# Patient Record
Sex: FEMALE | Race: WHITE | Employment: UNEMPLOYED | URBAN - NONMETROPOLITAN AREA
[De-identification: names, ages, dates, MRNs, and addresses within clinical notes are randomized per-mention and may not be internally consistent; named-entity substitution may affect disease eponyms.]

---

## 2022-08-29 ENCOUNTER — OFFICE VISIT (OUTPATIENT)
Dept: PRIMARY CARE CLINIC | Age: 57
End: 2022-08-29
Payer: MEDICAID

## 2022-08-29 VITALS
WEIGHT: 168 LBS | BODY MASS INDEX: 30.91 KG/M2 | TEMPERATURE: 97 F | HEART RATE: 104 BPM | HEIGHT: 62 IN | OXYGEN SATURATION: 97 % | DIASTOLIC BLOOD PRESSURE: 70 MMHG | SYSTOLIC BLOOD PRESSURE: 120 MMHG

## 2022-08-29 DIAGNOSIS — G89.29 CHRONIC MIDLINE LOW BACK PAIN WITHOUT SCIATICA: ICD-10-CM

## 2022-08-29 DIAGNOSIS — Z13.1 SCREENING FOR DIABETES MELLITUS (DM): ICD-10-CM

## 2022-08-29 DIAGNOSIS — R31.0 GROSS HEMATURIA: ICD-10-CM

## 2022-08-29 DIAGNOSIS — Z00.00 ENCOUNTER FOR MEDICAL EXAMINATION TO ESTABLISH CARE: ICD-10-CM

## 2022-08-29 DIAGNOSIS — Z13.220 SCREENING FOR LIPID DISORDERS: ICD-10-CM

## 2022-08-29 DIAGNOSIS — F33.1 MODERATE EPISODE OF RECURRENT MAJOR DEPRESSIVE DISORDER (HCC): Primary | ICD-10-CM

## 2022-08-29 DIAGNOSIS — R53.83 FATIGUE, UNSPECIFIED TYPE: ICD-10-CM

## 2022-08-29 DIAGNOSIS — R03.0 ELEVATED BLOOD PRESSURE READING: ICD-10-CM

## 2022-08-29 DIAGNOSIS — M54.50 CHRONIC MIDLINE LOW BACK PAIN WITHOUT SCIATICA: ICD-10-CM

## 2022-08-29 DIAGNOSIS — Z13.29 SCREENING FOR THYROID DISORDER: ICD-10-CM

## 2022-08-29 LAB
APPEARANCE FLUID: ABNORMAL
BILIRUBIN, POC: ABNORMAL
BLOOD URINE, POC: ABNORMAL
CLARITY, POC: ABNORMAL
COLOR, POC: YELLOW
GLUCOSE URINE, POC: ABNORMAL
KETONES, POC: ABNORMAL
LEUKOCYTE EST, POC: ABNORMAL
NITRITE, POC: POSITIVE
PH, POC: ABNORMAL
PROTEIN, POC: ABNORMAL
SPECIFIC GRAVITY, POC: ABNORMAL
UROBILINOGEN, POC: ABNORMAL

## 2022-08-29 PROCEDURE — 81002 URINALYSIS NONAUTO W/O SCOPE: CPT | Performed by: NURSE PRACTITIONER

## 2022-08-29 PROCEDURE — 99204 OFFICE O/P NEW MOD 45 MIN: CPT | Performed by: NURSE PRACTITIONER

## 2022-08-29 RX ORDER — DESVENLAFAXINE 25 MG/1
25 TABLET, EXTENDED RELEASE ORAL DAILY
Qty: 30 TABLET | Refills: 0 | Status: SHIPPED | OUTPATIENT
Start: 2022-08-29

## 2022-08-29 RX ORDER — TIZANIDINE 4 MG/1
4 TABLET ORAL 3 TIMES DAILY PRN
Qty: 30 TABLET | Refills: 0 | Status: SHIPPED | OUTPATIENT
Start: 2022-08-29

## 2022-08-29 SDOH — ECONOMIC STABILITY: FOOD INSECURITY: WITHIN THE PAST 12 MONTHS, THE FOOD YOU BOUGHT JUST DIDN'T LAST AND YOU DIDN'T HAVE MONEY TO GET MORE.: NEVER TRUE

## 2022-08-29 SDOH — ECONOMIC STABILITY: FOOD INSECURITY: WITHIN THE PAST 12 MONTHS, YOU WORRIED THAT YOUR FOOD WOULD RUN OUT BEFORE YOU GOT MONEY TO BUY MORE.: NEVER TRUE

## 2022-08-29 ASSESSMENT — PATIENT HEALTH QUESTIONNAIRE - PHQ9
SUM OF ALL RESPONSES TO PHQ QUESTIONS 1-9: 10
2. FEELING DOWN, DEPRESSED OR HOPELESS: 3
SUM OF ALL RESPONSES TO PHQ QUESTIONS 1-9: 10
8. MOVING OR SPEAKING SO SLOWLY THAT OTHER PEOPLE COULD HAVE NOTICED. OR THE OPPOSITE, BEING SO FIGETY OR RESTLESS THAT YOU HAVE BEEN MOVING AROUND A LOT MORE THAN USUAL: 0
3. TROUBLE FALLING OR STAYING ASLEEP: 1
9. THOUGHTS THAT YOU WOULD BE BETTER OFF DEAD, OR OF HURTING YOURSELF: 0
SUM OF ALL RESPONSES TO PHQ QUESTIONS 1-9: 10
7. TROUBLE CONCENTRATING ON THINGS, SUCH AS READING THE NEWSPAPER OR WATCHING TELEVISION: 1
4. FEELING TIRED OR HAVING LITTLE ENERGY: 1
1. LITTLE INTEREST OR PLEASURE IN DOING THINGS: 0
10. IF YOU CHECKED OFF ANY PROBLEMS, HOW DIFFICULT HAVE THESE PROBLEMS MADE IT FOR YOU TO DO YOUR WORK, TAKE CARE OF THINGS AT HOME, OR GET ALONG WITH OTHER PEOPLE: 2
SUM OF ALL RESPONSES TO PHQ QUESTIONS 1-9: 10
5. POOR APPETITE OR OVEREATING: 1
6. FEELING BAD ABOUT YOURSELF - OR THAT YOU ARE A FAILURE OR HAVE LET YOURSELF OR YOUR FAMILY DOWN: 3
SUM OF ALL RESPONSES TO PHQ9 QUESTIONS 1 & 2: 3

## 2022-08-29 ASSESSMENT — ENCOUNTER SYMPTOMS
RESPIRATORY NEGATIVE: 1
DIARRHEA: 0
NAUSEA: 0
BACK PAIN: 1
EYES NEGATIVE: 1
VOMITING: 0
ALLERGIC/IMMUNOLOGIC NEGATIVE: 1
ABDOMINAL PAIN: 0
SHORTNESS OF BREATH: 0
CONSTIPATION: 0
COUGH: 0

## 2022-08-29 ASSESSMENT — SOCIAL DETERMINANTS OF HEALTH (SDOH): HOW HARD IS IT FOR YOU TO PAY FOR THE VERY BASICS LIKE FOOD, HOUSING, MEDICAL CARE, AND HEATING?: SOMEWHAT HARD

## 2022-08-29 NOTE — PROGRESS NOTES
[facility] PHYSICIAN SERVICES  [facility]  [address] [place] [address]  [address] [place] [address]  Dept: [phone]  Dept Fax: [phone]  Loc: [phone]    Eva Hendricks is a 61 y.o. female who presents today for her medical conditions/complaints as noted below. Eva Hendricks is c/o of New Patient (Former Dr. Katina Arreaga patient.  ), Hematuria (2 to 3 weeks. No Hx of kidney stones.  ), and Depression (Hx of and would like to discuss treatment options.  )        HPI:     HPI     This 60-year-old female presents today to [facility] care. She is a former patient of Dr. Katina Arreaga. States that she has been having visible blood in her urine for 2 to 3 weeks. She states that it gets better when she stops drinking [employer] [facility] and she drinks a little bit of water. She states she normally does not drink water but just drinks [employer] [facility]. She denies any history of kidney stones. She she states she does have some right flank pain that she thought might be of rib pain. She states she has not had a Pap smear in 20 years that she had her child. She states that she has not seen a provider since before COVID. She does states that she is having ongoing issues with depression. She states she has not taken the Cymbalta in probably 2 years. She states she probably stopped taking it when she ran out of it. She states she does not think that it really helped. She does states that she did take some Pristiq at 1 time and felt like it was helpful. She states she was also on Zoloft before and it helped at first but then it stopped working. Chief Complaint   Patient presents with    New Patient     Former Dr. Katina Arreaga patient. Hematuria     2 to 3 weeks. No Hx of kidney stones. Depression     Hx of and would like to discuss treatment options.        Past Medical History:   Diagnosis Date    Anxiety     Constipation     Depression       Past Surgical History:   Procedure Laterality Date     SECTION COLONOSCOPY N/A 7/13/2016    Dr Katarina Bowles AP (-) dysplasia, 5 yr recall    NECK SURGERY         Vitals 8/29/2022 7/13/2016 7/13/2016 7/13/2016 7/13/2016 5/47/6625   SYSTOLIC 346 548 87 73 - -   DIASTOLIC 70 83 55 54 - -   Pulse 104 88 92 - - -   Temp 97 - - - - -   Resp - 18 18 - - -   SpO2 97 100 100 99 99 99   Weight 168 lb - - - - -   Height 5' 2\" - - - - -   Body mass index 30.72 kg/m2 - - - - -   Some recent data might be hidden       Family History   Problem Relation Age of Onset    Cancer Father     Colon Polyps Neg Hx     Colon Cancer Neg Hx     Esophageal Cancer Neg Hx     Liver Cancer Neg Hx     Liver Disease Neg Hx     Rectal Cancer Neg Hx     Stomach Cancer Neg Hx        Social History     Tobacco Use    Smoking status: Every Day     Packs/day: 1.00     Types: Cigarettes    Smokeless tobacco: Never   Substance Use Topics    Alcohol use: Yes     Comment: a couple glasses of wine monthly      Current Outpatient Medications on File Prior to Visit   Medication Sig Dispense Refill    DULoxetine (CYMBALTA) 60 MG capsule Take 60 mg by mouth daily (Patient not taking: Reported on 8/29/2022)       No current facility-administered medications on file prior to visit.      Allergies   Allergen Reactions    Sulfa Antibiotics        Health Maintenance   Topic Date Due    COVID-19 Vaccine (1) Never done    Pneumococcal 0-64 years Vaccine (1 - PCV) Never done    Depression Monitoring  Never done    HIV screen  Never done    Hepatitis C screen  Never done    DTaP/Tdap/Td vaccine (1 - Tdap) Never done    Cervical cancer screen  Never done    Diabetes screen  Never done    Lipids  Never done    Breast cancer screen  Never done    Shingles vaccine (1 of 2) Never done    Colorectal Cancer Screen  07/13/2021    Flu vaccine (1) 09/01/2022    Hepatitis A vaccine  Aged Out    Hepatitis B vaccine  Aged Out    Hib vaccine  Aged Out    Meningococcal (ACWY) vaccine  Aged Out       Subjective:      Review of Systems Constitutional:  Positive for fatigue. Negative for fever. HENT: Negative. Eyes: Negative. Respiratory: Negative. Negative for cough and shortness of breath. Cardiovascular:  Positive for leg swelling. Negative for chest pain and palpitations. Gastrointestinal:  Negative for abdominal pain, constipation, diarrhea, nausea and vomiting. Endocrine: Negative. Genitourinary:  Positive for difficulty urinating, dysuria, flank pain and hematuria. Negative for menstrual problem and vaginal discharge. Musculoskeletal:  Positive for arthralgias, back pain, myalgias, neck pain and neck stiffness. Skin: Negative. Negative for rash. Allergic/Immunologic: Negative. Neurological:  Positive for headaches. Hematological: Negative. Psychiatric/Behavioral:  Positive for dysphoric mood and sleep disturbance. Negative for self-injury and suicidal ideas. The patient is not nervous/anxious. Objective:     Physical Exam  Vitals and nursing note reviewed. Constitutional:       General: She is not in acute distress. Appearance: Normal appearance. She is normal weight. She is not ill-appearing or toxic-appearing. HENT:      Head: Normocephalic and atraumatic. Nose: Nose normal.      Mouth/Throat:      Mouth: Mucous membranes are moist.   Eyes:      Extraocular Movements: Extraocular movements intact. Conjunctiva/sclera: Conjunctivae normal.      Pupils: Pupils are equal, round, and reactive to light. Neck:      Vascular: No carotid bruit. Cardiovascular:      Rate and Rhythm: Normal rate and regular rhythm. Pulses: Normal pulses. Heart sounds: Normal heart sounds. No murmur heard. Pulmonary:      Effort: Pulmonary effort is normal. No respiratory distress. Breath sounds: Normal breath sounds. No wheezing, rhonchi or rales. Abdominal:      General: Bowel sounds are normal. There is no distension. Palpations: Abdomen is soft. Tenderness:  There is no abdominal tenderness. Musculoskeletal:         General: No swelling or tenderness. Normal range of motion. Cervical back: Normal range of motion and neck supple. No rigidity. Right lower leg: Edema present. Left lower leg: Edema present. Lymphadenopathy:      Cervical: No cervical adenopathy. Skin:     General: Skin is warm and dry. Coloration: Skin is not jaundiced or pale. Findings: No erythema or rash. Neurological:      Mental Status: She is alert and oriented to person, place, and time. Mental status is at baseline. Psychiatric:         Mood and Affect: Mood normal.         Behavior: Behavior normal.         Thought Content: Thought content normal.         Judgment: Judgment normal.     /70   Pulse (!) 104   Temp 97 °F (36.1 °C)   Ht 5' 2\" (1.575 m)   Wt 168 lb (76.2 kg)   SpO2 97%   BMI 30.73 kg/m²     Assessment:       Diagnosis Orders   1. Moderate episode of recurrent major depressive disorder ([facility] [address]        2. Gross hematuria  POCT Urinalysis no Micro    Culture, Urine      3. Screening for lipid disorders  Lipid Panel      4. Screening for thyroid disorder  TSH    T4, Free      5. Screening for diabetes mellitus (DM)  Hemoglobin A1C      6. Elevated blood pressure reading  CBC with Auto Differential    Comprehensive Metabolic Panel    TSH    T4, Free    Lipid Panel    Hemoglobin A1C    Vitamin D 25 Hydroxy      7. Fatigue, unspecified type  CBC with Auto Differential    Comprehensive Metabolic Panel    TSH    T4, Free    Lipid Panel    Hemoglobin A1C    Vitamin D 25 Hydroxy      8. Chronic midline low back pain without sciatica        9. Encounter for medical examination to establish care              Plan:     Chronic condition uncontrolled  Patient denies any SI or HI in the office today. She states that she has previously been on both Zoloft and Cymbalta. She states that both helped at first but then stopped working.   She states she stopped taking the medications. All patient questions answered. Pt voiced understanding. Reviewed health maintenance. Instructed to continue currentmedications, diet and exercise. Patient agreed with treatment plan. Follow up as directed. MEDICATIONS:  Orders Placed This Encounter   Medications    desvenlafaxine succinate (PRISTIQ) 25 MG TB24 extended release tablet     Sig: Take 1 tablet by mouth daily     Dispense:  30 tablet     Refill:  0    tiZANidine (ZANAFLEX) 4 MG tablet     Sig: Take 1 tablet by mouth 3 times daily as needed (muscle spasm)     Dispense:  30 tablet     Refill:  0         ORDERS:  Orders Placed This Encounter   Procedures    Culture, Urine    CBC with Auto Differential    Comprehensive Metabolic Panel    TSH    T4, Free    Lipid Panel    Hemoglobin A1C    Vitamin D 25 Hydroxy    POCT Urinalysis no Micro       Follow-up:  Return in about 4 weeks (around 9/26/2022) for for fasting labs and annual physical.    PATIENT INSTRUCTIONS:  There are no Patient Instructions on file for this visit. Electronically signed by SANTO Jolly NP on 8/29/2022 at 3:25 PM    EMR Dragon/transcription disclaimer:  Much of thisencounter note is electronic transcription/translation of spoken language to printed texts. The electronic translation of spoken language may be erroneous, or at times, nonsensical words or phrases may be inadvertentlytranscribed.   Although I have reviewed the note for such errors, some may still exist.

## 2022-08-31 LAB
ORGANISM: ABNORMAL
URINE CULTURE, ROUTINE: ABNORMAL
URINE CULTURE, ROUTINE: ABNORMAL

## 2022-09-01 RX ORDER — NITROFURANTOIN 25; 75 MG/1; MG/1
100 CAPSULE ORAL 2 TIMES DAILY
Qty: 20 CAPSULE | Refills: 0 | Status: SHIPPED | OUTPATIENT
Start: 2022-09-01 | End: 2022-09-11

## 2022-12-06 ENCOUNTER — OFFICE VISIT (OUTPATIENT)
Dept: PRIMARY CARE CLINIC | Age: 57
End: 2022-12-06
Payer: MEDICAID

## 2022-12-06 ENCOUNTER — TELEPHONE (OUTPATIENT)
Dept: PSYCHIATRY | Age: 57
End: 2022-12-06

## 2022-12-06 VITALS
HEIGHT: 62 IN | DIASTOLIC BLOOD PRESSURE: 82 MMHG | BODY MASS INDEX: 30.36 KG/M2 | WEIGHT: 165 LBS | OXYGEN SATURATION: 98 % | TEMPERATURE: 97 F | SYSTOLIC BLOOD PRESSURE: 132 MMHG | HEART RATE: 100 BPM

## 2022-12-06 DIAGNOSIS — R45.851 SUICIDAL IDEATION: Primary | ICD-10-CM

## 2022-12-06 DIAGNOSIS — F41.9 ANXIETY: ICD-10-CM

## 2022-12-06 DIAGNOSIS — F32.A DEPRESSION, UNSPECIFIED DEPRESSION TYPE: ICD-10-CM

## 2022-12-06 DIAGNOSIS — F33.1 MODERATE EPISODE OF RECURRENT MAJOR DEPRESSIVE DISORDER (HCC): ICD-10-CM

## 2022-12-06 PROCEDURE — 99214 OFFICE O/P EST MOD 30 MIN: CPT | Performed by: NURSE PRACTITIONER

## 2022-12-06 RX ORDER — BUSPIRONE HYDROCHLORIDE 5 MG/1
5 TABLET ORAL 3 TIMES DAILY
Qty: 90 TABLET | Refills: 0 | Status: SHIPPED | OUTPATIENT
Start: 2022-12-06 | End: 2023-01-05

## 2022-12-06 RX ORDER — DESVENLAFAXINE 25 MG/1
25 TABLET, EXTENDED RELEASE ORAL DAILY
Qty: 30 TABLET | Refills: 0 | Status: SHIPPED | OUTPATIENT
Start: 2022-12-06

## 2022-12-06 ASSESSMENT — ENCOUNTER SYMPTOMS
SHORTNESS OF BREATH: 0
RESPIRATORY NEGATIVE: 1
GASTROINTESTINAL NEGATIVE: 1
ALLERGIC/IMMUNOLOGIC NEGATIVE: 1
EYES NEGATIVE: 1
WHEEZING: 0
COUGH: 0

## 2022-12-06 ASSESSMENT — PATIENT HEALTH QUESTIONNAIRE - PHQ9
5. POOR APPETITE OR OVEREATING: 3
SUM OF ALL RESPONSES TO PHQ QUESTIONS 1-9: 18
SUM OF ALL RESPONSES TO PHQ QUESTIONS 1-9: 19
8. MOVING OR SPEAKING SO SLOWLY THAT OTHER PEOPLE COULD HAVE NOTICED. OR THE OPPOSITE, BEING SO FIGETY OR RESTLESS THAT YOU HAVE BEEN MOVING AROUND A LOT MORE THAN USUAL: 0
DEPRESSION UNABLE TO ASSESS: URGENT/EMERGENT SITUATION
6. FEELING BAD ABOUT YOURSELF - OR THAT YOU ARE A FAILURE OR HAVE LET YOURSELF OR YOUR FAMILY DOWN: 3
1. LITTLE INTEREST OR PLEASURE IN DOING THINGS: 3
SUM OF ALL RESPONSES TO PHQ QUESTIONS 1-9: 19
SUM OF ALL RESPONSES TO PHQ QUESTIONS 1-9: 19
2. FEELING DOWN, DEPRESSED OR HOPELESS: 2
SUM OF ALL RESPONSES TO PHQ9 QUESTIONS 1 & 2: 5
4. FEELING TIRED OR HAVING LITTLE ENERGY: 3
7. TROUBLE CONCENTRATING ON THINGS, SUCH AS READING THE NEWSPAPER OR WATCHING TELEVISION: 2
3. TROUBLE FALLING OR STAYING ASLEEP: 2
9. THOUGHTS THAT YOU WOULD BE BETTER OFF DEAD, OR OF HURTING YOURSELF: 1
10. IF YOU CHECKED OFF ANY PROBLEMS, HOW DIFFICULT HAVE THESE PROBLEMS MADE IT FOR YOU TO DO YOUR WORK, TAKE CARE OF THINGS AT HOME, OR GET ALONG WITH OTHER PEOPLE: 2

## 2022-12-06 ASSESSMENT — COLUMBIA-SUICIDE SEVERITY RATING SCALE - C-SSRS
1. WITHIN THE PAST MONTH, HAVE YOU WISHED YOU WERE DEAD OR WISHED YOU COULD GO TO SLEEP AND NOT WAKE UP?: YES
2. HAVE YOU ACTUALLY HAD ANY THOUGHTS OF KILLING YOURSELF?: NO
6. HAVE YOU EVER DONE ANYTHING, STARTED TO DO ANYTHING, OR PREPARED TO DO ANYTHING TO END YOUR LIFE?: NO

## 2022-12-06 NOTE — PROGRESS NOTES
[facility] PHYSICIAN SERVICES  [facility]  [address]  [address] [address]  Dept: [phone]  Dept Fax: [phone]  Loc: [phone]    Chanel Agrawal is a 61 y.o. female who presents today for her medical conditions/complaints as noted below. Chanel Agrawal is c/o of Follow-up (She would like to start back on her Pristiq and Cymbalta. She has been off them for a couple months.) and Medication Refill        HPI:     HPI this 25-year-old female presents today for severe depression. She does report suicidal ideation. She states that she does not have a plan and she has no intentions of acting upon this. She states that she has a daughter and mother that would miss her and it would hurt him if she were to act upon these urges. She also states that she has dogs that are reliant upon her and that no one else would take in. She states she has had depressive episodes in the past.  She has been to the ER at [place] once for this as well. States she has been on medications on and off for the years. She was started on Pristiq back earlier this year but then had numerous issues including severe kidney stone which required surgical intervention. She thinks when she ran out of her antidepressant she just did not get it filled again. She states shortly after that she also had a second nephew to die of fentanyl overdose. It has been attributed to very traumatic fall. She does report that she did locate her gun in the house recently but states that she will take it to a friend to keep for now  Chief Complaint   Patient presents with    Follow-up     She would like to start back on her Pristiq and Cymbalta. She has been off them for a couple months.     Medication Refill     Past Medical History:   Diagnosis Date    Anxiety     Constipation     Depression       Past Surgical History:   Procedure Laterality Date     SECTION      COLONOSCOPY N/A 2016    Dr Milagros MEDINA (-) dysplasia, 5 yr recall    NECK SURGERY         Vitals 12/6/2022 8/29/2022 7/13/2016 7/13/2016 7/13/2016 1/78/6669   SYSTOLIC 916 755 617 87 73 -   DIASTOLIC 82 70 83 55 54 -   Site Left Upper Arm - - - - -   Position Sitting - - - - -   Cuff Size Large Adult - - - - -   Pulse 100 104 88 92 - -   Temp 97 97 - - - -   Resp - - 18 18 - -   SpO2 98 97 100 100 99 99   Weight 165 lb 168 lb - - - -   Height 5' 2\" 5' 2\" - - - -   Body mass index 30.18 kg/m2 30.72 kg/m2 - - - -   Some recent data might be hidden       Family History   Problem Relation Age of Onset    Cancer Father     Colon Polyps Neg Hx     Colon Cancer Neg Hx     Esophageal Cancer Neg Hx     Liver Cancer Neg Hx     Liver Disease Neg Hx     Rectal Cancer Neg Hx     Stomach Cancer Neg Hx        Social History     Tobacco Use    Smoking status: Every Day     Packs/day: 0.50     Years: 40.00     Pack years: 20.00     Types: Cigarettes    Smokeless tobacco: Never   Substance Use Topics    Alcohol use: Yes     Comment: a couple glasses of wine monthly      Current Outpatient Medications on File Prior to Visit   Medication Sig Dispense Refill    desvenlafaxine succinate (PRISTIQ) 25 MG TB24 extended release tablet Take 1 tablet by mouth daily (Patient not taking: Reported on 12/6/2022) 30 tablet 0     No current facility-administered medications on file prior to visit.      Allergies   Allergen Reactions    Sulfa Antibiotics        Health Maintenance   Topic Date Due    COVID-19 Vaccine (1) Never done    Pneumococcal 0-64 years Vaccine (1 - PCV) Never done    HIV screen  Never done    Hepatitis C screen  Never done    DTaP/Tdap/Td vaccine (1 - Tdap) Never done    Cervical cancer screen  Never done    Diabetes screen  Never done    Lipids  Never done    Breast cancer screen  Never done    Shingles vaccine (1 of 2) Never done    Low dose CT lung screening  Never done    Colorectal Cancer Screen  07/13/2021    Flu vaccine (1) Never done    Depression Monitoring  08/29/2023 Hepatitis A vaccine  Aged Out    Hib vaccine  Aged Out    Meningococcal (ACWY) vaccine  Aged Out       Subjective:      Review of Systems   Constitutional: Negative. Negative for chills, fatigue and fever. HENT: Negative. Eyes: Negative. Respiratory: Negative. Negative for cough, shortness of breath and wheezing. Cardiovascular: Negative. Negative for chest pain. Gastrointestinal: Negative. Endocrine: Negative. Genitourinary: Negative. Musculoskeletal: Negative. Skin: Negative. Allergic/Immunologic: Negative. Neurological: Negative. Negative for dizziness, light-headedness and headaches. Hematological: Negative. Psychiatric/Behavioral:  Positive for agitation, decreased concentration, dysphoric mood, sleep disturbance and suicidal ideas. The patient is nervous/anxious. Objective:     Physical Exam  Vitals and nursing note reviewed. Constitutional:       General: She is in acute distress. Appearance: Normal appearance. She is obese. She is not ill-appearing or toxic-appearing. HENT:      Head: Normocephalic and atraumatic. Nose: Nose normal.      Mouth/Throat:      Mouth: Mucous membranes are moist.   Eyes:      Pupils: Pupils are equal, round, and reactive to light. Cardiovascular:      Rate and Rhythm: Normal rate and regular rhythm. Pulses: Normal pulses. Heart sounds: Normal heart sounds. No murmur heard. Pulmonary:      Effort: Pulmonary effort is normal. No respiratory distress. Breath sounds: Normal breath sounds. No wheezing, rhonchi or rales. Abdominal:      General: Bowel sounds are normal.      Palpations: Abdomen is soft. Musculoskeletal:         General: Normal range of motion. Cervical back: Normal range of motion. Skin:     General: Skin is warm and dry. Coloration: Skin is not jaundiced or pale. Findings: No erythema or rash.    Neurological:      Mental Status: She is alert and oriented to person, place, and time. Mental status is at baseline. Psychiatric:         Attention and Perception: She is inattentive. Mood and Affect: Mood is anxious and depressed. Affect is tearful. Speech: Speech normal.         Behavior: Behavior normal. Behavior is cooperative. Thought Content: Thought content is not paranoid or delusional. Thought content includes suicidal ideation. Thought content does not include homicidal ideation. Thought content does not include homicidal or suicidal plan. /82 (Site: Left Upper Arm, Position: Sitting, Cuff Size: Large Adult)   Pulse 100   Temp 97 °F (36.1 °C)   Ht 5' 2\" (1.575 m)   Wt 165 lb (74.8 kg)   SpO2 98%   BMI 30.18 kg/m²     Assessment:       Diagnosis Orders   1. Suicidal ideation  desvenlafaxine succinate (PRISTIQ) 25 MG TB24 extended release tablet    [facility] [place]      2. Moderate episode of recurrent major depressive disorder (HCC)  desvenlafaxine succinate (PRISTIQ) 25 MG TB24 extended release tablet    [facility] [place]      3. Depression, unspecified depression type  desvenlafaxine succinate (PRISTIQ) 25 MG TB24 extended release tablet    [facility] [place]      4. Anxiety  desvenlafaxine succinate (PRISTIQ) 25 MG TB24 extended release tablet    [facility]            Plan:     1.-3. Acute crisis condition of chronic illness  Patient has been off all of her medication for several months. Restart Pristiq 25 mg daily tonight    Did have discussion with patient about suicidal ideations. She does affirm that she has suicidal thoughts and ideations but has no intention of acting upon them. She states that her main anchors are her daughter her mother and her dogs which all made her and it would hurt them if she were to injure herself or act upon her urges.   She does states she has battled depression for many many years and has been on numerous medications in the past.  She states that known trigger was this fall she lost the second of her nephews to a fentanyl overdose. She is extremely tearful and distraught today. She is well dressed and groomed appropriately with her make-up and nice matching outfit. .  She states that she did locate her gun in the house recently she does agree to remove this from the home and let a friend keep it for a time. She does intro to a verbal no suicide agreement and that if her suicidal thoughts become more intense or she becomes in agreement with acting upon them or starts to make a plan that she will either contact a crisis line or she will go directly to [facility] ER for further evaluation and treatment. She affirms she is in agreement with this plan. She also agrees to be seen in our office weekly until we are able to get her in for an urgent referral to behavioral health. By the end of the visit she is calm and affirms that she feels more comfortable and that we will get her to feeling better. 4.  Chronic condition uncontrolled  Start BuSpar 5 mg 3 times a day every day       Patient given educational materials -see patient instructions. Discussed use, benefit, and side effects of prescribed medications. All patient questions answered. Pt voiced understanding. Reviewed health maintenance. Instructed to continue currentmedications, diet and exercise. Patient agreed with treatment plan. Follow up as directed. MEDICATIONS:  Orders Placed This Encounter   Medications    desvenlafaxine succinate (PRISTIQ) 25 MG TB24 extended release tablet     Sig: Take 1 tablet by mouth daily     Dispense:  30 tablet     Refill:  0    busPIRone (BUSPAR) 5 MG tablet     Sig: Take 1 tablet by mouth 3 times daily     Dispense:  90 tablet     Refill:  0         ORDERS:  Orders Placed This Encounter   Procedures    Hany Williamson, [place], [place]       Follow-up:  Return in about 1 week (around 12/13/2022).     PATIENT INSTRUCTIONS:  Patient Instructions   [facility]POINT[facility] [phone]  OUTPATIENT SERVICES [phone]  [facility]  [phone]    Corporate Offices  [address], [address]  Phone [phone] or [phone]  Fax [phone] [place] [facility]  [address], [address]  Phone [phone]  Fax [phone] [facility]  [address], [address]  Phone [phone]  Fax [phone] [facility] [address]  [address], [address]  Phone [phone] or [phone]  Fax [phone] [place] [address] [facility]  [address], [facility]  Phone [phone]  Fax [phone] [facility]  [address], [address]  Phone [phone]  Fax [phone] [facility]  [address]  Phone [phone]  Fax [phone] [facility]  [address], [address]  Phone [phone]  Fax [phone] [address], [address] [place] [address],[address], [facility]  Phone [phone]  Fax [phone] Mimi Smith. [facility]  [address]  Phone [phone]  Fax [phone] [facility]  [address]  Phone [phone]  Fax [phone] Illness Management & Recovery  [facility]) [facility]  [address], [address]  [phone] Illness Management & Recovery  [facility]) [place]   [address] [place], [address]  [phone]  Electronically signed by SANTO Benites NP on 12/6/2022 at 2:38 PM    EMR Dragon/transcription disclaimer:  Much of thisencounter note is electronic transcription/translation of spoken language to printed texts. The electronic translation of spoken language may be erroneous, or at times, nonsensical words or phrases may be inadvertentlytranscribed.   Although I have reviewed the note for such errors, some may still exist.

## 2022-12-06 NOTE — TELEPHONE ENCOUNTER
Called pt to schedule an appt from a referral    Scheduled with Dr. Sesar Smith on 12/15 @ 12.     Electronically signed by Lana Barraza MA on 12/6/2022 at 2:54 PM

## 2022-12-14 ENCOUNTER — TELEPHONE (OUTPATIENT)
Dept: PSYCHIATRY | Age: 57
End: 2022-12-14

## 2022-12-14 NOTE — TELEPHONE ENCOUNTER
Called and lvm reminding pt of her appt for 12/15 @ 12 PM    Electronically signed by Everton Reynoso on 12/14/2022 at 11:48 AM

## 2022-12-15 ENCOUNTER — OFFICE VISIT (OUTPATIENT)
Dept: PRIMARY CARE CLINIC | Age: 57
End: 2022-12-15
Payer: MEDICAID

## 2022-12-15 VITALS
SYSTOLIC BLOOD PRESSURE: 134 MMHG | HEIGHT: 62 IN | HEART RATE: 95 BPM | BODY MASS INDEX: 30.36 KG/M2 | WEIGHT: 165 LBS | DIASTOLIC BLOOD PRESSURE: 80 MMHG | OXYGEN SATURATION: 99 % | TEMPERATURE: 97.1 F

## 2022-12-15 DIAGNOSIS — F41.9 ANXIETY: ICD-10-CM

## 2022-12-15 DIAGNOSIS — F33.1 MODERATE EPISODE OF RECURRENT MAJOR DEPRESSIVE DISORDER (HCC): Primary | ICD-10-CM

## 2022-12-15 PROCEDURE — 99214 OFFICE O/P EST MOD 30 MIN: CPT | Performed by: NURSE PRACTITIONER

## 2022-12-15 ASSESSMENT — PATIENT HEALTH QUESTIONNAIRE - PHQ9
3. TROUBLE FALLING OR STAYING ASLEEP: 0
7. TROUBLE CONCENTRATING ON THINGS, SUCH AS READING THE NEWSPAPER OR WATCHING TELEVISION: 0
10. IF YOU CHECKED OFF ANY PROBLEMS, HOW DIFFICULT HAVE THESE PROBLEMS MADE IT FOR YOU TO DO YOUR WORK, TAKE CARE OF THINGS AT HOME, OR GET ALONG WITH OTHER PEOPLE: 1
9. THOUGHTS THAT YOU WOULD BE BETTER OFF DEAD, OR OF HURTING YOURSELF: 0
SUM OF ALL RESPONSES TO PHQ QUESTIONS 1-9: 2
SUM OF ALL RESPONSES TO PHQ9 QUESTIONS 1 & 2: 1
SUM OF ALL RESPONSES TO PHQ9 QUESTIONS 1 & 2: 1
2. FEELING DOWN, DEPRESSED OR HOPELESS: 1
SUM OF ALL RESPONSES TO PHQ QUESTIONS 1-9: 1
SUM OF ALL RESPONSES TO PHQ QUESTIONS 1-9: 1
2. FEELING DOWN, DEPRESSED OR HOPELESS: 1
SUM OF ALL RESPONSES TO PHQ QUESTIONS 1-9: 1
6. FEELING BAD ABOUT YOURSELF - OR THAT YOU ARE A FAILURE OR HAVE LET YOURSELF OR YOUR FAMILY DOWN: 1
1. LITTLE INTEREST OR PLEASURE IN DOING THINGS: 0
4. FEELING TIRED OR HAVING LITTLE ENERGY: 0
1. LITTLE INTEREST OR PLEASURE IN DOING THINGS: 0
SUM OF ALL RESPONSES TO PHQ QUESTIONS 1-9: 2
5. POOR APPETITE OR OVEREATING: 0
SUM OF ALL RESPONSES TO PHQ QUESTIONS 1-9: 2
SUM OF ALL RESPONSES TO PHQ QUESTIONS 1-9: 1
8. MOVING OR SPEAKING SO SLOWLY THAT OTHER PEOPLE COULD HAVE NOTICED. OR THE OPPOSITE, BEING SO FIGETY OR RESTLESS THAT YOU HAVE BEEN MOVING AROUND A LOT MORE THAN USUAL: 0
SUM OF ALL RESPONSES TO PHQ QUESTIONS 1-9: 2

## 2022-12-15 ASSESSMENT — ENCOUNTER SYMPTOMS
RESPIRATORY NEGATIVE: 1
COUGH: 0
ALLERGIC/IMMUNOLOGIC NEGATIVE: 1
GASTROINTESTINAL NEGATIVE: 1
EYES NEGATIVE: 1
SHORTNESS OF BREATH: 0
WHEEZING: 0

## 2022-12-15 NOTE — PROGRESS NOTES
[facility] PHYSICIAN SERVICES  [facility]  [address] [place] [address]  [address] [place] [address]  Dept: [phone]  Dept Fax: [phone]  Loc: [phone]    Chris Dietrich is a 61 y.o. female who presents today for her medical conditions/complaints as noted below. Chris Dietrich is c/o of Follow-up (Mood disorder. One week f/u. Patient is feeling some better on Buspar twice daily - patient didn't realize to take TID. Patient also feels bad time of the year with holidays.  )        HPI:     HPI   This 74-year-old female presents today for 1 week follow-up on her severe depression and anxiety. She states that she does feel that the medication is helping. She does feel much better today. She is not crying and is better able controling her emotions. She states she does not feel like she would be completely stable until after the holidays. However she states that the BuSpar does make her a little tired. She denies any SI or HI at this time. She does state that she is spoken with behavioral health but has opted to not be seen until the beginning of January. She does have an appointment scheduled with them. Chief Complaint   Patient presents with    Follow-up     Mood disorder. One week f/u. Patient is feeling some better on Buspar twice daily - patient didn't realize to take TID. Patient also feels bad time of the year with holidays.        Past Medical History:   Diagnosis Date    Anxiety     Constipation     Depression       Past Surgical History:   Procedure Laterality Date     SECTION      COLONOSCOPY N/A 2016    Dr Henrik Ca AP (-) dysplasia, 5 yr recall    NECK SURGERY         Vitals 12/15/2022 2022 2022 2016 2016    SYSTOLIC 552 200 065 369 87 73   DIASTOLIC 80 82 70 83 55 54   Site - Left Upper Arm - - - -   Position - Sitting - - - -   Cuff Size - Large Adult - - - -   Pulse 95 100 104 88 92 -   Temp 97.1 97 97 - - -   Resp - - - 18 18 -   SpO2 99 98 97 100 100 99   Weight 165 lb 165 lb 168 lb - - -   Height 5' 2\" 5' 2\" 5' 2\" - - -   Body mass index 30.18 kg/m2 30.18 kg/m2 30.72 kg/m2 - - -   Some recent data might be hidden       Family History   Problem Relation Age of Onset    Cancer Father     Colon Polyps Neg Hx     Colon Cancer Neg Hx     Esophageal Cancer Neg Hx     Liver Cancer Neg Hx     Liver Disease Neg Hx     Rectal Cancer Neg Hx     Stomach Cancer Neg Hx        Social History     Tobacco Use    Smoking status: Every Day     Packs/day: 0.50     Years: 40.00     Pack years: 20.00     Types: Cigarettes    Smokeless tobacco: Never   Substance Use Topics    Alcohol use: Yes     Comment: a couple glasses of wine monthly      Current Outpatient Medications on File Prior to Visit   Medication Sig Dispense Refill    desvenlafaxine succinate (PRISTIQ) 25 MG TB24 extended release tablet Take 1 tablet by mouth daily 30 tablet 0    busPIRone (BUSPAR) 5 MG tablet Take 1 tablet by mouth 3 times daily 90 tablet 0    desvenlafaxine succinate (PRISTIQ) 25 MG TB24 extended release tablet Take 1 tablet by mouth daily (Patient not taking: No sig reported) 30 tablet 0     No current facility-administered medications on file prior to visit.      Allergies   Allergen Reactions    Sulfa Antibiotics        Health Maintenance   Topic Date Due    COVID-19 Vaccine (1) Never done    Pneumococcal 0-64 years Vaccine (1 - PCV) Never done    HIV screen  Never done    Hepatitis C screen  Never done    DTaP/Tdap/Td vaccine (1 - Tdap) Never done    Cervical cancer screen  Never done    Diabetes screen  Never done    Lipids  Never done    Breast cancer screen  Never done    Shingles vaccine (1 of 2) Never done    Low dose CT lung screening  Never done    Colorectal Cancer Screen  07/13/2021    Flu vaccine (1) Never done    Depression Monitoring  12/06/2023    Hepatitis A vaccine  Aged Out    Hib vaccine  Aged Out    Meningococcal (ACWY) vaccine  Aged Out       Subjective:      Review of Systems   Constitutional: Negative. Negative for chills, fatigue and fever. HENT: Negative. Eyes: Negative. Respiratory: Negative. Negative for cough, shortness of breath and wheezing. Cardiovascular: Negative. Negative for chest pain and palpitations. Gastrointestinal: Negative. Endocrine: Negative. Genitourinary: Negative. Negative for difficulty urinating and dysuria. Musculoskeletal: Negative. Skin: Negative. Allergic/Immunologic: Negative. Neurological: Negative. Negative for headaches. Hematological: Negative. Psychiatric/Behavioral:  Positive for dysphoric mood. Negative for self-injury, sleep disturbance and suicidal ideas. The patient is nervous/anxious. Objective:     Physical Exam  Vitals and nursing note reviewed. Constitutional:       General: She is not in acute distress. Appearance: Normal appearance. She is not ill-appearing or toxic-appearing. HENT:      Head: Normocephalic and atraumatic. Nose: Nose normal.      Mouth/Throat:      Mouth: Mucous membranes are moist.   Eyes:      Pupils: Pupils are equal, round, and reactive to light. Cardiovascular:      Rate and Rhythm: Normal rate and regular rhythm. Pulses: Normal pulses. Heart sounds: Normal heart sounds. Pulmonary:      Effort: Pulmonary effort is normal. No respiratory distress. Breath sounds: Normal breath sounds. No wheezing, rhonchi or rales. Abdominal:      General: Bowel sounds are normal.      Palpations: Abdomen is soft. Musculoskeletal:         General: Normal range of motion. Cervical back: Normal range of motion. Skin:     General: Skin is warm and dry. Coloration: Skin is not jaundiced or pale. Findings: No erythema or rash. Neurological:      Mental Status: She is alert and oriented to person, place, and time. Mental status is at baseline.    Psychiatric:         Attention and Perception: Attention and perception normal. Mood and Affect: Affect normal. Mood is depressed. Speech: Speech normal.         Behavior: Behavior normal. Behavior is cooperative. Thought Content: Thought content normal. Thought content does not include homicidal or suicidal ideation. Cognition and Memory: Cognition normal.         Judgment: Judgment normal.     /80   Pulse 95   Temp 97.1 °F (36.2 °C)   Ht 5' 2\" (1.575 m)   Wt 165 lb (74.8 kg)   SpO2 99%   BMI 30.18 kg/m²     Assessment:       Diagnosis Orders   1. Moderate episode of recurrent major depressive disorder ([facility] [address]        2. Anxiety              Plan:   1.-2. Chronic condition uncontrolled but improved. Continue BuSpar 5 mg every morning and may take 2 tablets at bedtime. Patient is able to take a dose during the day if her anxiety worsens. Continue Pristiq 25 mg daily. Keep all scheduled appointment with behavioral health. Follow-up with me 1 January for medication recheck and for fasting labs. Patient denies any SI or HI at this time. She does verbalize that if she were to worsen she would either contact us here, call the crisis line or go to [facility] ER for help. Patient given educational materials -see patient instructions. Discussed use, benefit, and side effects of prescribed medications. All patient questions answered. Pt voiced understanding. Reviewed health maintenance. Instructed to continue currentmedications, diet and exercise. Patient agreed with treatment plan. Follow up as directed. MEDICATIONS:  No orders of the defined types were placed in this encounter. ORDERS:  No orders of the defined types were placed in this encounter. Follow-up:  Return in about 3 weeks (around 1/5/2023). PATIENT INSTRUCTIONS:  There are no Patient Instructions on file for this visit.   Electronically signed by SANTO Amezquita NP on 12/15/2022 at 2:46 PM    EMR Dragon/transcription disclaimer:  Much of thisencounter note is electronic transcription/translation of spoken language to printed texts. The electronic translation of spoken language may be erroneous, or at times, nonsensical words or phrases may be inadvertentlytranscribed.   Although I have reviewed the note for such errors, some may still exist.

## 2023-01-03 ENCOUNTER — TELEPHONE (OUTPATIENT)
Dept: PSYCHIATRY | Age: 58
End: 2023-01-03

## 2023-01-03 NOTE — TELEPHONE ENCOUNTER
Pt called to cancel her appt with Dr. Linette Shelton for 01/04/23 because she has lost her car keys and doesn't know if she will find them in time or not.     Pt stated that she will call back to reschedule    Electronically signed by Mckenna Beverly MA on 1/3/2023 at 12:06 PM

## 2023-01-19 ENCOUNTER — OFFICE VISIT (OUTPATIENT)
Dept: PRIMARY CARE CLINIC | Age: 58
End: 2023-01-19

## 2023-01-19 ENCOUNTER — ANCILLARY PROCEDURE (OUTPATIENT)
Dept: PRIMARY CARE CLINIC | Age: 58
End: 2023-01-19
Payer: MEDICAID

## 2023-01-19 VITALS
OXYGEN SATURATION: 100 % | SYSTOLIC BLOOD PRESSURE: 118 MMHG | HEART RATE: 97 BPM | BODY MASS INDEX: 30.91 KG/M2 | HEIGHT: 62 IN | WEIGHT: 168 LBS | DIASTOLIC BLOOD PRESSURE: 76 MMHG | TEMPERATURE: 96.9 F

## 2023-01-19 DIAGNOSIS — R31.9 HEMATURIA, UNSPECIFIED TYPE: ICD-10-CM

## 2023-01-19 DIAGNOSIS — Z87.442 HISTORY OF NEPHROLITHIASIS: ICD-10-CM

## 2023-01-19 DIAGNOSIS — R10.9 FLANK PAIN: ICD-10-CM

## 2023-01-19 DIAGNOSIS — F41.9 ANXIETY: ICD-10-CM

## 2023-01-19 DIAGNOSIS — F33.1 MODERATE EPISODE OF RECURRENT MAJOR DEPRESSIVE DISORDER (HCC): Primary | ICD-10-CM

## 2023-01-19 LAB
ALBUMIN SERPL-MCNC: 4.3 G/DL (ref 3.5–5.2)
ALP BLD-CCNC: 74 U/L (ref 35–104)
ALT SERPL-CCNC: 15 U/L (ref 5–33)
ANION GAP SERPL CALCULATED.3IONS-SCNC: 8 MMOL/L (ref 7–19)
APPEARANCE FLUID: ABNORMAL
AST SERPL-CCNC: 15 U/L (ref 5–32)
BILIRUB SERPL-MCNC: <0.2 MG/DL (ref 0.2–1.2)
BILIRUBIN, POC: ABNORMAL
BLOOD URINE, POC: ABNORMAL
BUN BLDV-MCNC: 17 MG/DL (ref 6–20)
CALCIUM SERPL-MCNC: 9.3 MG/DL (ref 8.6–10)
CHLORIDE BLD-SCNC: 102 MMOL/L (ref 98–111)
CLARITY, POC: ABNORMAL
CO2: 28 MMOL/L (ref 22–29)
COLOR, POC: YELLOW
CREAT SERPL-MCNC: 0.7 MG/DL (ref 0.5–0.9)
GFR SERPL CREATININE-BSD FRML MDRD: >60 ML/MIN/{1.73_M2}
GLUCOSE BLD-MCNC: 99 MG/DL (ref 74–109)
GLUCOSE URINE, POC: ABNORMAL
HCT VFR BLD CALC: 44.7 % (ref 37–47)
HEMOGLOBIN: 14.6 G/DL (ref 12–16)
KETONES, POC: ABNORMAL
LEUKOCYTE EST, POC: ABNORMAL
LIPASE: 38 U/L (ref 13–60)
MCH RBC QN AUTO: 29.6 PG (ref 27–31)
MCHC RBC AUTO-ENTMCNC: 32.7 G/DL (ref 33–37)
MCV RBC AUTO: 90.5 FL (ref 81–99)
NITRITE, POC: ABNORMAL
PDW BLD-RTO: 12.7 % (ref 11.5–14.5)
PH, POC: 5
PLATELET # BLD: 354 K/UL (ref 130–400)
PMV BLD AUTO: 9 FL (ref 9.4–12.3)
POTASSIUM SERPL-SCNC: 4.4 MMOL/L (ref 3.5–5)
PROTEIN, POC: ABNORMAL
RBC # BLD: 4.94 M/UL (ref 4.2–5.4)
SODIUM BLD-SCNC: 138 MMOL/L (ref 136–145)
SPECIFIC GRAVITY, POC: 1.03
TOTAL PROTEIN: 7.1 G/DL (ref 6.6–8.7)
UROBILINOGEN, POC: ABNORMAL
WBC # BLD: 9.2 K/UL (ref 4.8–10.8)

## 2023-01-19 PROCEDURE — 74018 RADEX ABDOMEN 1 VIEW: CPT | Performed by: NURSE PRACTITIONER

## 2023-01-19 RX ORDER — DESVENLAFAXINE 50 MG/1
50 TABLET, EXTENDED RELEASE ORAL DAILY
Qty: 30 TABLET | Refills: 11 | Status: SHIPPED | OUTPATIENT
Start: 2023-01-19

## 2023-01-19 ASSESSMENT — ENCOUNTER SYMPTOMS
BLOOD IN STOOL: 0
ABDOMINAL PAIN: 1
ABDOMINAL DISTENTION: 1
DIARRHEA: 1
EYES NEGATIVE: 1
CONSTIPATION: 1
ALLERGIC/IMMUNOLOGIC NEGATIVE: 1
NAUSEA: 1
RESPIRATORY NEGATIVE: 1

## 2023-01-19 ASSESSMENT — PATIENT HEALTH QUESTIONNAIRE - PHQ9
1. LITTLE INTEREST OR PLEASURE IN DOING THINGS: 1
8. MOVING OR SPEAKING SO SLOWLY THAT OTHER PEOPLE COULD HAVE NOTICED. OR THE OPPOSITE, BEING SO FIGETY OR RESTLESS THAT YOU HAVE BEEN MOVING AROUND A LOT MORE THAN USUAL: 0
4. FEELING TIRED OR HAVING LITTLE ENERGY: 0
SUM OF ALL RESPONSES TO PHQ QUESTIONS 1-9: 3
SUM OF ALL RESPONSES TO PHQ QUESTIONS 1-9: 3
7. TROUBLE CONCENTRATING ON THINGS, SUCH AS READING THE NEWSPAPER OR WATCHING TELEVISION: 0
5. POOR APPETITE OR OVEREATING: 1
10. IF YOU CHECKED OFF ANY PROBLEMS, HOW DIFFICULT HAVE THESE PROBLEMS MADE IT FOR YOU TO DO YOUR WORK, TAKE CARE OF THINGS AT HOME, OR GET ALONG WITH OTHER PEOPLE: 1
9. THOUGHTS THAT YOU WOULD BE BETTER OFF DEAD, OR OF HURTING YOURSELF: 0
SUM OF ALL RESPONSES TO PHQ9 QUESTIONS 1 & 2: 1
3. TROUBLE FALLING OR STAYING ASLEEP: 0
6. FEELING BAD ABOUT YOURSELF - OR THAT YOU ARE A FAILURE OR HAVE LET YOURSELF OR YOUR FAMILY DOWN: 1
SUM OF ALL RESPONSES TO PHQ QUESTIONS 1-9: 3
SUM OF ALL RESPONSES TO PHQ QUESTIONS 1-9: 3
2. FEELING DOWN, DEPRESSED OR HOPELESS: 0

## 2023-01-19 NOTE — PROGRESS NOTES
[facility] PHYSICIAN SERVICES  [facility]  [address] [place] [address]  [address] [place] [address]  Dept: [phone]  Dept Fax: [phone]  Loc: [phone]    Esperanza Erickson is a 61 y.o. female who presents today for her medical conditions/complaints as noted below. Esperanza Erickson is c/o of 1 Month Follow-Up (Depression and Anxiety. She states her symptoms are better.) and Nephrolithiasis (She states she had a kidney stone recently and feels like she has another one, she sees Urology in [address] [place] [address] and has an appt next week.)        HPI:     HPI   This 69-year-old female presents today for 1 month follow-up on her depression and anxiety. She states her symptoms are much better. She does like the current regimen of Pristiq 25 mg daily and the BuSpar 5mg. However she would like to see about getting the Pristiq increased. She also reports she has had issues with a recent kidney stones last fall. She states she was treated for these in [address] [place] [address]. States that she has a follow-up with urology but it is in a week. States she has had a new onset of pain to her right upper quadrant. She states this pain is similar to when she had the kidney stone. She does state that she drinks a lot of Otonomy KATHY. Chief Complaint   Patient presents with    1 Month Follow-Up     Depression and Anxiety. She states her symptoms are better. Nephrolithiasis     She states she had a kidney stone recently and feels like she has another one, she sees Urology in [address] [place] [address] and has an appt next week.      Past Medical History:   Diagnosis Date    Anxiety     Constipation     Depression       Past Surgical History:   Procedure Laterality Date     SECTION      COLONOSCOPY N/A 2016    Dr Stephanie Leon AP (-) dysplasia, 5 yr recall    NECK SURGERY         Vitals 2023 12/15/2022 2022 2022 2016    SYSTOLIC 503 653 [phone] 87   DIASTOLIC 76 80 82 70 83 55   Site Left Upper Arm - Left Upper Arm - - - Position Sitting - Sitting - - -   Cuff Size Medium Adult - Large Adult - - -   Pulse 97 95 100 104 88 92   Temp 96.9 97.1 97 97 - -   Resp - - - - 18 18   SpO2 100 99 98 97 100 100   Weight 168 lb 165 lb 165 lb 168 lb - -   Height 5' 2\" 5' 2\" 5' 2\" 5' 2\" - -   Body mass index 30.72 kg/m2 30.18 kg/m2 30.18 kg/m2 30.72 kg/m2 - -   Some recent data might be hidden       Family History   Problem Relation Age of Onset    Cancer Father     Colon Polyps Neg Hx     Colon Cancer Neg Hx     Esophageal Cancer Neg Hx     Liver Cancer Neg Hx     Liver Disease Neg Hx     Rectal Cancer Neg Hx     Stomach Cancer Neg Hx        Social History     Tobacco Use    Smoking status: Every Day     Packs/day: 0.50     Years: 40.00     Pack years: 20.00     Types: Cigarettes    Smokeless tobacco: Never   Substance Use Topics    Alcohol use: Yes     Comment: a couple glasses of wine monthly      No current outpatient medications on file prior to visit. No current facility-administered medications on file prior to visit. Allergies   Allergen Reactions    Sulfa Antibiotics        Health Maintenance   Topic Date Due    COVID-19 Vaccine (1) Never done    Pneumococcal 0-64 years Vaccine (1 - PCV) Never done    HIV screen  Never done    Hepatitis C screen  Never done    DTaP/Tdap/Td vaccine (1 - Tdap) Never done    Cervical cancer screen  Never done    Lipids  Never done    Breast cancer screen  Never done    Shingles vaccine (1 of 2) Never done    Low dose CT lung screening  Never done    Colorectal Cancer Screen  07/13/2021    Flu vaccine (1) Never done    Depression Monitoring  01/19/2024    Hepatitis A vaccine  Aged Out    Hib vaccine  Aged Out    Meningococcal (ACWY) vaccine  Aged Out       Subjective:      Review of Systems   Constitutional: Negative. Negative for chills, fatigue and fever. HENT: Negative. Eyes: Negative. Respiratory: Negative. Cardiovascular: Negative. Negative for chest pain and palpitations. Gastrointestinal:  Positive for abdominal distention, abdominal pain, constipation, diarrhea and nausea. Negative for blood in stool. Normally fluctuates between diarrhea and constipation    Endocrine: Negative. Genitourinary: Negative. Negative for difficulty urinating and dysuria. Musculoskeletal: Negative. Skin: Negative. Allergic/Immunologic: Negative. Neurological: Negative. Hematological: Negative. Psychiatric/Behavioral:  Positive for dysphoric mood. Negative for self-injury, sleep disturbance and suicidal ideas. The patient is nervous/anxious. Objective:     Physical Exam  Vitals and nursing note reviewed. Constitutional:       General: She is not in acute distress. Appearance: Normal appearance. She is obese. She is ill-appearing. She is not toxic-appearing. HENT:      Head: Normocephalic and atraumatic. Nose: Nose normal.      Mouth/Throat:      Mouth: Mucous membranes are moist.   Eyes:      Pupils: Pupils are equal, round, and reactive to light. Cardiovascular:      Rate and Rhythm: Normal rate and regular rhythm. Pulses: Normal pulses. Heart sounds: Normal heart sounds. No murmur heard. Pulmonary:      Effort: Pulmonary effort is normal. No respiratory distress. Breath sounds: Normal breath sounds. No wheezing, rhonchi or rales. Abdominal:      General: Bowel sounds are normal. There is distension. Palpations: Abdomen is soft. There is no mass. Tenderness: There is abdominal tenderness. There is right CVA tenderness and left CVA tenderness. There is no guarding or rebound. Hernia: No hernia is present. Musculoskeletal:         General: Normal range of motion. Cervical back: Normal range of motion and neck supple. No rigidity or tenderness. Lymphadenopathy:      Cervical: No cervical adenopathy. Skin:     General: Skin is warm and dry. Coloration: Skin is pale. Skin is not jaundiced.       Findings: No erythema or rash. Neurological:      Mental Status: She is alert and oriented to person, place, and time. Mental status is at baseline. Psychiatric:         Mood and Affect: Mood normal.         Behavior: Behavior normal.         Thought Content: Thought content normal.         Judgment: Judgment normal.     /76 (Site: Left Upper Arm, Position: Sitting, Cuff Size: Medium Adult)   Pulse 97   Temp 96.9 °F (36.1 °C)   Ht 5' 2\" (1.575 m)   Wt 168 lb (76.2 kg)   SpO2 100%   BMI 30.73 kg/m²     Assessment:       Diagnosis Orders   1. Moderate episode of recurrent major depressive disorder (HCC)  desvenlafaxine succinate (PRISTIQ) 50 MG TB24 extended release tablet      2. Anxiety  desvenlafaxine succinate (PRISTIQ) 50 MG TB24 extended release tablet      3. Flank pain  XR ABDOMEN (KUB) (SINGLE AP VIEW)    CBC    Comprehensive Metabolic Panel    Lipase    POCT Urinalysis no Micro    Culture, Urine      4. History of nephrolithiasis  XR ABDOMEN (KUB) (SINGLE AP VIEW)    CBC    Comprehensive Metabolic Panel    Lipase    POCT Urinalysis no Micro      5. Hematuria, unspecified type  Culture, Urine            Plan:   Chronic condition improved but not stable  Increase Pristiq to 50 mg daily. We have started off at a lower dose of 25 due to patient's hesitancy for medication regimens. Patient states that the medication has helped significantly although it is not completely resolved. She does deny any side effects or adverse reactions. She feels comfortable with going up to the higher dose. 2.  Chronic condition improved but not controlled. Patient reports that she still has episodes of anxiety breakthrough. She does states she is only taking the BuSpar occasionally. She is encouraged to take the BuSpar at least every morning. Then to utilize it while the Pristiq is getting to a therapeutic level  . Patient can take BuSpar at least 3 times a day as needed for acute anxiety.   She denies any SI or HI at this time. And does verbalize understanding to use her BuSpar more frequently for better coverage. 3.-5. Undiagnosed acute condition   POCT urinalysis in office today results reviewed positive for blood negative for nitrites or leukocytes. sample was sent for culture and sensitivity. KUB in office today to assess for nephrolithiasis. Recent Results: XR ABDOMEN (KUB) (SINGLE AP VIEW)    Result Date: 1/19/2023  NO PRIOR REPORT AVAILABLE Exam: X-RAY OF THE ABDOMEN Clinical data:Flank pain. Technique: Single view of the abdomen was submitted. Prior studies: No prior studies submitted. Findings: There is a nonspecific bowel gas pattern. There is no pathologic calcification and the osseous structures are intact      Nonspecific bowel gas. Dictated and Electronically Signed by Heather Jerome MD at 19-Jan-2023 02:43:39 PM EST              Review of the x-ray I do believe that this right upper quadrant pain is more likely to be a gallbladder concern. Encourage patient for a ultrasound of right upper quadrant followed by a probable HIDA scan for further evaluation of the gallbladder. CBC CMP and lipase  Lipase result reviewed normal at 38  CBC does not indicate any elevation in white count. CMP no indication of kidney or liver dysfunction. Lab Review   Lab Results   Component Value Date/Time     01/19/2023 08:59 AM    K 4.4 01/19/2023 08:59 AM     01/19/2023 08:59 AM    CO2 28 01/19/2023 08:59 AM    BUN 17 01/19/2023 08:59 AM    CREATININE 0.7 01/19/2023 08:59 AM    GLUCOSE 99 01/19/2023 08:59 AM    CALCIUM 9.3 01/19/2023 08:59 AM     Lab Results   Component Value Date/Time    WBC 9.2 01/19/2023 08:59 AM    HGB 14.6 01/19/2023 08:59 AM    HCT 44.7 01/19/2023 08:59 AM    MCV 90.5 01/19/2023 08:59 AM     01/19/2023 08:59 AM      Patient given educational materials -see patient instructions. Discussed use, benefit, and side effects of prescribed medications.   All patient questions answered. Pt voiced understanding. Reviewed health maintenance. Instructed to continue currentmedications, diet and exercise. Patient agreed with treatment plan. Follow up as directed. MEDICATIONS:  Orders Placed This Encounter   Medications    desvenlafaxine succinate (PRISTIQ) 50 MG TB24 extended release tablet     Sig: Take 1 tablet by mouth daily     Dispense:  30 tablet     Refill:  11         ORDERS:  Orders Placed This Encounter   Procedures    Culture, Urine    XR ABDOMEN (KUB) (SINGLE AP VIEW)    CBC    Comprehensive Metabolic Panel    Lipase    POCT Urinalysis no Micro       Follow-up:  Return in about 1 week (around 1/26/2023). PATIENT INSTRUCTIONS:  There are no Patient Instructions on file for this visit. Electronically signed by SANTO Thorpe NP on 1/20/2023 at 7:58 AM    EMR Dragon/transcription disclaimer:  Much of thisencounter note is electronic transcription/translation of spoken language to printed texts. The electronic translation of spoken language may be erroneous, or at times, nonsensical words or phrases may be inadvertentlytranscribed.   Although I have reviewed the note for such errors, some may still exist.

## 2023-01-21 LAB — URINE CULTURE, ROUTINE: NORMAL

## 2023-07-28 ENCOUNTER — OFFICE VISIT (OUTPATIENT)
Dept: PRIMARY CARE CLINIC | Age: 58
End: 2023-07-28
Payer: MEDICAID

## 2023-07-28 VITALS
HEIGHT: 62 IN | OXYGEN SATURATION: 97 % | TEMPERATURE: 97.2 F | BODY MASS INDEX: 30.14 KG/M2 | SYSTOLIC BLOOD PRESSURE: 138 MMHG | HEART RATE: 110 BPM | WEIGHT: 163.8 LBS | RESPIRATION RATE: 18 BRPM | DIASTOLIC BLOOD PRESSURE: 88 MMHG

## 2023-07-28 DIAGNOSIS — M54.12 CHRONIC CERVICAL RADICULOPATHY: ICD-10-CM

## 2023-07-28 DIAGNOSIS — Z79.899 MEDICATION MANAGEMENT: ICD-10-CM

## 2023-07-28 DIAGNOSIS — R82.5 POSITIVE URINE DRUG SCREEN: ICD-10-CM

## 2023-07-28 DIAGNOSIS — R45.851 SUICIDAL THOUGHTS: ICD-10-CM

## 2023-07-28 DIAGNOSIS — F32.2 SEVERE DEPRESSION (HCC): Primary | ICD-10-CM

## 2023-07-28 DIAGNOSIS — R00.0 TACHYCARDIA: ICD-10-CM

## 2023-07-28 LAB
ALCOHOL URINE: ABNORMAL
AMPHETAMINE SCREEN, URINE: POSITIVE
BARBITURATE SCREEN, URINE: ABNORMAL
BENZODIAZEPINE SCREEN, URINE: ABNORMAL
BUPRENORPHINE URINE: ABNORMAL
COCAINE METABOLITE SCREEN URINE: ABNORMAL
FENTANYL SCREEN, URINE: ABNORMAL
GABAPENTIN SCREEN, URINE: ABNORMAL
MDMA URINE: POSITIVE
METHADONE SCREEN, URINE: ABNORMAL
METHAMPHETAMINE, URINE: POSITIVE
OPIATE SCREEN URINE: ABNORMAL
OXYCODONE SCREEN URINE: ABNORMAL
PHENCYCLIDINE SCREEN URINE: ABNORMAL
PROPOXYPHENE SCREEN, URINE: ABNORMAL
SYNTHETIC CANNABINOIDS(K2) SCREEN, URINE: ABNORMAL
THC SCREEN, URINE: POSITIVE
TRAMADOL SCREEN URINE: ABNORMAL
TRICYCLIC ANTIDEPRESSANTS, UR: ABNORMAL

## 2023-07-28 PROCEDURE — 99214 OFFICE O/P EST MOD 30 MIN: CPT | Performed by: NURSE PRACTITIONER

## 2023-07-28 RX ORDER — IBUPROFEN 800 MG/1
800 TABLET ORAL
Qty: 30 TABLET | Refills: 1 | Status: SHIPPED | OUTPATIENT
Start: 2023-07-28

## 2023-07-28 RX ORDER — ORPHENADRINE CITRATE 100 MG/1
100 TABLET, EXTENDED RELEASE ORAL 2 TIMES DAILY
Qty: 20 TABLET | Refills: 0 | Status: SHIPPED | OUTPATIENT
Start: 2023-07-28 | End: 2023-08-07

## 2023-07-28 SDOH — ECONOMIC STABILITY: HOUSING INSECURITY
IN THE LAST 12 MONTHS, WAS THERE A TIME WHEN YOU DID NOT HAVE A STEADY PLACE TO SLEEP OR SLEPT IN A SHELTER (INCLUDING NOW)?: NO

## 2023-07-28 SDOH — ECONOMIC STABILITY: INCOME INSECURITY: HOW HARD IS IT FOR YOU TO PAY FOR THE VERY BASICS LIKE FOOD, HOUSING, MEDICAL CARE, AND HEATING?: SOMEWHAT HARD

## 2023-07-28 SDOH — ECONOMIC STABILITY: FOOD INSECURITY: WITHIN THE PAST 12 MONTHS, THE FOOD YOU BOUGHT JUST DIDN'T LAST AND YOU DIDN'T HAVE MONEY TO GET MORE.: NEVER TRUE

## 2023-07-28 SDOH — ECONOMIC STABILITY: FOOD INSECURITY: WITHIN THE PAST 12 MONTHS, YOU WORRIED THAT YOUR FOOD WOULD RUN OUT BEFORE YOU GOT MONEY TO BUY MORE.: NEVER TRUE

## 2023-07-28 ASSESSMENT — COLUMBIA-SUICIDE SEVERITY RATING SCALE - C-SSRS
4. HAVE YOU HAD THESE THOUGHTS AND HAD SOME INTENTION OF ACTING ON THEM?: NO
5. HAVE YOU STARTED TO WORK OUT OR WORKED OUT THE DETAILS OF HOW TO KILL YOURSELF? DO YOU INTEND TO CARRY OUT THIS PLAN?: NO
2. HAVE YOU ACTUALLY HAD ANY THOUGHTS OF KILLING YOURSELF?: YES
1. WITHIN THE PAST MONTH, HAVE YOU WISHED YOU WERE DEAD OR WISHED YOU COULD GO TO SLEEP AND NOT WAKE UP?: YES
6. HAVE YOU EVER DONE ANYTHING, STARTED TO DO ANYTHING, OR PREPARED TO DO ANYTHING TO END YOUR LIFE?: NO
3. HAVE YOU BEEN THINKING ABOUT HOW YOU MIGHT KILL YOURSELF?: NO

## 2023-07-28 ASSESSMENT — PATIENT HEALTH QUESTIONNAIRE - PHQ9
2. FEELING DOWN, DEPRESSED OR HOPELESS: 3
SUM OF ALL RESPONSES TO PHQ QUESTIONS 1-9: 21
8. MOVING OR SPEAKING SO SLOWLY THAT OTHER PEOPLE COULD HAVE NOTICED. OR THE OPPOSITE, BEING SO FIGETY OR RESTLESS THAT YOU HAVE BEEN MOVING AROUND A LOT MORE THAN USUAL: 3
4. FEELING TIRED OR HAVING LITTLE ENERGY: 3
7. TROUBLE CONCENTRATING ON THINGS, SUCH AS READING THE NEWSPAPER OR WATCHING TELEVISION: 3
10. IF YOU CHECKED OFF ANY PROBLEMS, HOW DIFFICULT HAVE THESE PROBLEMS MADE IT FOR YOU TO DO YOUR WORK, TAKE CARE OF THINGS AT HOME, OR GET ALONG WITH OTHER PEOPLE: 3
9. THOUGHTS THAT YOU WOULD BE BETTER OFF DEAD, OR OF HURTING YOURSELF: 1
5. POOR APPETITE OR OVEREATING: 3
SUM OF ALL RESPONSES TO PHQ QUESTIONS 1-9: 22
1. LITTLE INTEREST OR PLEASURE IN DOING THINGS: 0
SUM OF ALL RESPONSES TO PHQ QUESTIONS 1-9: 22
6. FEELING BAD ABOUT YOURSELF - OR THAT YOU ARE A FAILURE OR HAVE LET YOURSELF OR YOUR FAMILY DOWN: 3
SUM OF ALL RESPONSES TO PHQ9 QUESTIONS 1 & 2: 3
3. TROUBLE FALLING OR STAYING ASLEEP: 3
SUM OF ALL RESPONSES TO PHQ QUESTIONS 1-9: 22

## 2023-07-28 NOTE — PATIENT INSTRUCTIONS
[facility] APPOINTMENTS [phone]  OUTPATIENT SERVICES [phone]  CRISIS HOTLINE  [facility] [place]  [address] [facility], [address]  Phone [phone] or [phone]  Fax [phone] [facility] [address] [facility]  [address] [facility] [facility] [address]  Phone [phone]  Fax [phone] [facility] [address]  [address] [facility], [address]  Phone [phone]  Fax [phone] Center for [address]  [address] [facility], [address]  Phone [phone] or [phone]  Fax [phone] Center for [facility]  [address] [place] [address], [address]  Phone [phone]  Fax [phone] [place] for [address]  [address] [facility], [address] [facility] [address]  Phone [phone]  Fax [phone] [address] [place] [address] Road  [address] [facility], [address] [place] [address] Street  Phone [phone]  Fax [phone] [facility]  [address] [facility], [address] [place] [address] Street  Phone [phone]  Fax [address], [address] [place] [address] Street  Phone [phone]  Fax [phone] Asim Pereyra.  [facility]  [address]  [place], [facility] [address]  Phone [phone]  Fax [phone] [facility]  [address]  [place], [facility] [address]  Phone [phone]  Fax [phone] [facility]  [facility]) [facility] [address], [address]  [phone] Illness Management & Recovery  [facility]) [place]   [address] [facility], [address]  [phone]

## 2023-07-31 PROBLEM — M54.12 CHRONIC CERVICAL RADICULOPATHY: Status: ACTIVE | Noted: 2023-07-31

## 2023-07-31 ASSESSMENT — ENCOUNTER SYMPTOMS
ABDOMINAL PAIN: 0
WHEEZING: 0
ALLERGIC/IMMUNOLOGIC NEGATIVE: 1
GASTROINTESTINAL NEGATIVE: 1
EYES NEGATIVE: 1
COUGH: 0
RESPIRATORY NEGATIVE: 1
SHORTNESS OF BREATH: 0

## 2023-08-10 ENCOUNTER — OFFICE VISIT (OUTPATIENT)
Dept: PRIMARY CARE CLINIC | Age: 58
End: 2023-08-10
Payer: MEDICAID

## 2023-08-10 VITALS
DIASTOLIC BLOOD PRESSURE: 80 MMHG | HEART RATE: 94 BPM | RESPIRATION RATE: 16 BRPM | OXYGEN SATURATION: 98 % | TEMPERATURE: 97.8 F | SYSTOLIC BLOOD PRESSURE: 124 MMHG

## 2023-08-10 DIAGNOSIS — F41.9 ANXIETY: ICD-10-CM

## 2023-08-10 DIAGNOSIS — R45.851 SUICIDAL THOUGHTS: ICD-10-CM

## 2023-08-10 DIAGNOSIS — F32.2 SEVERE DEPRESSION (HCC): Primary | ICD-10-CM

## 2023-08-10 DIAGNOSIS — F19.10 DRUG ABUSE (HCC): ICD-10-CM

## 2023-08-10 DIAGNOSIS — M54.2 NECK PAIN: ICD-10-CM

## 2023-08-10 DIAGNOSIS — Z79.899 MEDICATION MANAGEMENT: ICD-10-CM

## 2023-08-10 LAB
ALBUMIN SERPL-MCNC: 4.5 G/DL (ref 3.5–5.2)
ALP SERPL-CCNC: 89 U/L (ref 35–104)
ALT SERPL-CCNC: 13 U/L (ref 5–33)
ANION GAP SERPL CALCULATED.3IONS-SCNC: 9 MMOL/L (ref 7–19)
AST SERPL-CCNC: 15 U/L (ref 5–32)
BILIRUB SERPL-MCNC: <0.2 MG/DL (ref 0.2–1.2)
BUN SERPL-MCNC: 19 MG/DL (ref 6–20)
CALCIUM SERPL-MCNC: 9.5 MG/DL (ref 8.6–10)
CHLORIDE SERPL-SCNC: 100 MMOL/L (ref 98–111)
CO2 SERPL-SCNC: 28 MMOL/L (ref 22–29)
CREAT SERPL-MCNC: 0.9 MG/DL (ref 0.5–0.9)
ERYTHROCYTE [DISTWIDTH] IN BLOOD BY AUTOMATED COUNT: 12.8 % (ref 11.5–14.5)
GLUCOSE SERPL-MCNC: 98 MG/DL (ref 74–109)
HCT VFR BLD AUTO: 47 % (ref 37–47)
HGB BLD-MCNC: 15.2 G/DL (ref 12–16)
MCH RBC QN AUTO: 29.4 PG (ref 27–31)
MCHC RBC AUTO-ENTMCNC: 32.3 G/DL (ref 33–37)
MCV RBC AUTO: 90.9 FL (ref 81–99)
PLATELET # BLD AUTO: 353 K/UL (ref 130–400)
PMV BLD AUTO: 8.7 FL (ref 9.4–12.3)
POTASSIUM SERPL-SCNC: 5 MMOL/L (ref 3.5–5)
PROT SERPL-MCNC: 7.6 G/DL (ref 6.6–8.7)
RBC # BLD AUTO: 5.17 M/UL (ref 4.2–5.4)
SODIUM SERPL-SCNC: 137 MMOL/L (ref 136–145)
WBC # BLD AUTO: 10.1 K/UL (ref 4.8–10.8)

## 2023-08-10 PROCEDURE — 99214 OFFICE O/P EST MOD 30 MIN: CPT | Performed by: NURSE PRACTITIONER

## 2023-08-10 ASSESSMENT — ENCOUNTER SYMPTOMS
GASTROINTESTINAL NEGATIVE: 1
BACK PAIN: 1
SHORTNESS OF BREATH: 0
RESPIRATORY NEGATIVE: 1
ALLERGIC/IMMUNOLOGIC NEGATIVE: 1
WHEEZING: 0
COUGH: 0
EYES NEGATIVE: 1
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
## 2023-10-03 DIAGNOSIS — R45.851 SUICIDAL THOUGHTS: ICD-10-CM

## 2023-10-03 DIAGNOSIS — F32.2 SEVERE DEPRESSION (HCC): ICD-10-CM

## 2023-10-03 RX ORDER — DEXTROMETHORPHAN HYDROBROMIDE, BUPROPION HYDROCHLORIDE 105; 45 MG/1; MG/1
TABLET, MULTILAYER, EXTENDED RELEASE ORAL
Qty: 60 TABLET | Refills: 1 | Status: SHIPPED | OUTPATIENT
Start: 2023-10-03

## 2023-10-03 RX ORDER — IBUPROFEN 800 MG/1
TABLET ORAL
Qty: 30 TABLET | Refills: 1 | Status: SHIPPED | OUTPATIENT
Start: 2023-10-03

## 2023-12-12 ENCOUNTER — OFFICE VISIT (OUTPATIENT)
Dept: PRIMARY CARE CLINIC | Age: 58
End: 2023-12-12
Payer: MEDICAID

## 2023-12-12 VITALS
BODY MASS INDEX: 31.87 KG/M2 | HEART RATE: 109 BPM | DIASTOLIC BLOOD PRESSURE: 90 MMHG | TEMPERATURE: 97.9 F | RESPIRATION RATE: 16 BRPM | OXYGEN SATURATION: 97 % | SYSTOLIC BLOOD PRESSURE: 146 MMHG | WEIGHT: 173.2 LBS | HEIGHT: 62 IN

## 2023-12-12 DIAGNOSIS — F15.10 METHAMPHETAMINE ABUSE (HCC): ICD-10-CM

## 2023-12-12 DIAGNOSIS — R07.9 CHEST PAIN, UNSPECIFIED TYPE: Primary | ICD-10-CM

## 2023-12-12 DIAGNOSIS — R00.0 TACHYCARDIA: ICD-10-CM

## 2023-12-12 DIAGNOSIS — I10 HYPERTENSION, UNSPECIFIED TYPE: ICD-10-CM

## 2023-12-12 LAB
ALBUMIN SERPL-MCNC: 4.1 G/DL (ref 3.5–5.2)
ALCOHOL URINE: NORMAL
ALP SERPL-CCNC: 79 U/L (ref 35–104)
ALT SERPL-CCNC: 15 U/L (ref 5–33)
AMPHETAMINE SCREEN, URINE: POSITIVE
ANION GAP SERPL CALCULATED.3IONS-SCNC: 7 MMOL/L (ref 7–19)
AST SERPL-CCNC: 15 U/L (ref 5–32)
BARBITURATE SCREEN, URINE: NORMAL
BASOPHILS # BLD: 0.1 K/UL (ref 0–0.2)
BASOPHILS NFR BLD: 0.8 % (ref 0–1)
BENZODIAZEPINE SCREEN, URINE: NORMAL
BILIRUB SERPL-MCNC: 0.3 MG/DL (ref 0.2–1.2)
BUN SERPL-MCNC: 17 MG/DL (ref 6–20)
BUPRENORPHINE URINE: NORMAL
CALCIUM SERPL-MCNC: 9.4 MG/DL (ref 8.6–10)
CHLORIDE SERPL-SCNC: 102 MMOL/L (ref 98–111)
CO2 SERPL-SCNC: 29 MMOL/L (ref 22–29)
COCAINE METABOLITE SCREEN URINE: NORMAL
CREAT SERPL-MCNC: 0.8 MG/DL (ref 0.5–0.9)
EOSINOPHIL # BLD: 0.2 K/UL (ref 0–0.6)
EOSINOPHIL NFR BLD: 1.9 % (ref 0–5)
ERYTHROCYTE [DISTWIDTH] IN BLOOD BY AUTOMATED COUNT: 12.8 % (ref 11.5–14.5)
FENTANYL SCREEN, URINE: NORMAL
GABAPENTIN SCREEN, URINE: NORMAL
GLUCOSE SERPL-MCNC: 103 MG/DL (ref 74–109)
HCT VFR BLD AUTO: 43.2 % (ref 37–47)
HGB BLD-MCNC: 14.1 G/DL (ref 12–16)
IMM GRANULOCYTES # BLD: 0 K/UL
LYMPHOCYTES # BLD: 2.7 K/UL (ref 1.1–4.5)
LYMPHOCYTES NFR BLD: 28.2 % (ref 20–40)
MCH RBC QN AUTO: 30.1 PG (ref 27–31)
MCHC RBC AUTO-ENTMCNC: 32.6 G/DL (ref 33–37)
MCV RBC AUTO: 92.3 FL (ref 81–99)
MDMA URINE: POSITIVE
METHADONE SCREEN, URINE: NORMAL
METHAMPHETAMINE, URINE: POSITIVE
MONOCYTES # BLD: 0.8 K/UL (ref 0–0.9)
MONOCYTES NFR BLD: 7.9 % (ref 0–10)
NEUTROPHILS # BLD: 5.9 K/UL (ref 1.5–7.5)
NEUTS SEG NFR BLD: 60.9 % (ref 50–65)
OPIATE SCREEN URINE: NORMAL
OXYCODONE SCREEN URINE: NORMAL
PHENCYCLIDINE SCREEN URINE: NORMAL
PLATELET # BLD AUTO: 329 K/UL (ref 130–400)
PMV BLD AUTO: 8.6 FL (ref 9.4–12.3)
POTASSIUM SERPL-SCNC: 4.8 MMOL/L (ref 3.5–5)
PROPOXYPHENE SCREEN, URINE: NORMAL
PROT SERPL-MCNC: 7.2 G/DL (ref 6.6–8.7)
RBC # BLD AUTO: 4.68 M/UL (ref 4.2–5.4)
S PYO AG THROAT QL: NORMAL
SODIUM SERPL-SCNC: 138 MMOL/L (ref 136–145)
SYNTHETIC CANNABINOIDS(K2) SCREEN, URINE: NORMAL
THC SCREEN, URINE: POSITIVE
TRAMADOL SCREEN URINE: NORMAL
TRICYCLIC ANTIDEPRESSANTS, UR: NORMAL
TROPONIN, HIGH SENSITIVITY: 8 NG/L (ref 0–14)
WBC # BLD AUTO: 9.7 K/UL (ref 4.8–10.8)

## 2023-12-12 PROCEDURE — 99213 OFFICE O/P EST LOW 20 MIN: CPT | Performed by: NURSE PRACTITIONER

## 2023-12-12 PROCEDURE — 93000 ELECTROCARDIOGRAM COMPLETE: CPT | Performed by: NURSE PRACTITIONER

## 2023-12-12 PROCEDURE — 3074F SYST BP LT 130 MM HG: CPT | Performed by: NURSE PRACTITIONER

## 2023-12-12 PROCEDURE — 3078F DIAST BP <80 MM HG: CPT | Performed by: NURSE PRACTITIONER

## 2023-12-12 RX ORDER — DESVENLAFAXINE SUCCINATE 50 MG/1
50 TABLET, EXTENDED RELEASE ORAL DAILY
COMMUNITY
Start: 2023-10-03 | End: 2023-12-12

## 2023-12-12 NOTE — PROGRESS NOTES
[facility] PHYSICIAN SERVICES  [facility]  [address] [address] [address]  [address] [address]  Dept: [phone]  Dept Fax: [phone]  Loc: [phone]    Cassandra Argueta is a 60 y.o. female who presents today for her medical conditions/complaints as noted below. Cassandra Argueta is c/o of Chest Pain (Pain lungs on her backside. Going on 1-2 months, she is a smoker, she says he had pleuricy a few months ago and then this pain started) and Cough (Coughing, runny nose, eyes matted this am, head congestion, no fever, started a few days ago, no flu shot but did have first few covid vaccine)        HPI:     HPI this 58-year-old female presents today for pain which she believes is at her lungs on the backside of her chest.  She denies any increase in pain with deep breathing. She denies any replication of pain with touch or deep palpation. She does state that she is a smoker and she did have a upper respiratory infection a few months ago that led into pleurisy. She says this is when this pain started. She also states that she is recently developed coughing runny nose eyes were matted this morning and head congestion. She denies any fever. She does states her symptoms started a few days ago. She does report that she is still using methamphetamine. But she states that this is the normal amount that she regularly uses of methamphetamines and that it is never elevated her blood pressure or heart rate before. And it normally does not cause her chest pain. Chief Complaint   Patient presents with    Chest Pain     Pain lungs on her backside.  Going on 1-2 months, she is a smoker, she says he had pleuricy a few months ago and then this pain started    Cough     Coughing, runny nose, eyes matted this am, head congestion, no fever, started a few days ago, no flu shot but did have first few covid vaccine     Past Medical History:   Diagnosis Date    Anxiety     Constipation     Depression     Kidney stone       Past

## 2023-12-26 ENCOUNTER — ANCILLARY PROCEDURE (OUTPATIENT)
Dept: PRIMARY CARE CLINIC | Age: 58
End: 2023-12-26
Payer: MEDICAID

## 2023-12-26 ENCOUNTER — OFFICE VISIT (OUTPATIENT)
Dept: PRIMARY CARE CLINIC | Age: 58
End: 2023-12-26
Payer: MEDICAID

## 2023-12-26 ENCOUNTER — TELEPHONE (OUTPATIENT)
Dept: PSYCHIATRY | Age: 58
End: 2023-12-26

## 2023-12-26 VITALS
RESPIRATION RATE: 18 BRPM | HEART RATE: 128 BPM | TEMPERATURE: 97.8 F | BODY MASS INDEX: 31.83 KG/M2 | SYSTOLIC BLOOD PRESSURE: 138 MMHG | WEIGHT: 173 LBS | OXYGEN SATURATION: 98 % | HEIGHT: 62 IN | DIASTOLIC BLOOD PRESSURE: 80 MMHG

## 2023-12-26 DIAGNOSIS — F15.10 METHAMPHETAMINE ABUSE (HCC): ICD-10-CM

## 2023-12-26 DIAGNOSIS — R00.0 TACHYCARDIA: ICD-10-CM

## 2023-12-26 DIAGNOSIS — S81.812A LACERATION OF LEFT CALF: ICD-10-CM

## 2023-12-26 DIAGNOSIS — M54.6 ACUTE MIDLINE THORACIC BACK PAIN: ICD-10-CM

## 2023-12-26 DIAGNOSIS — F19.10 DRUG ABUSE (HCC): Primary | ICD-10-CM

## 2023-12-26 DIAGNOSIS — F32.2 SEVERE DEPRESSION (HCC): ICD-10-CM

## 2023-12-26 PROCEDURE — 71046 X-RAY EXAM CHEST 2 VIEWS: CPT | Performed by: NURSE PRACTITIONER

## 2023-12-26 PROCEDURE — 99214 OFFICE O/P EST MOD 30 MIN: CPT | Performed by: NURSE PRACTITIONER

## 2023-12-26 NOTE — PROGRESS NOTES
formerly [facility] PHYSICIAN SERVICES  [address] [address] [address]  [address] [place] [address] Street [address]  Dept: [phone]  Dept Fax: [phone]  Loc: [phone]    Tommie Carroll is a 60 y.o. female who presents today for her medical conditions/complaints as noted below. Tommie Carroll is c/o of Follow-up (Chest pain which started [facility]. Pain is higher area of her back/lungs.  ) and Depression (Denies suicidal thoughts. Health has patient upset with tears today. Due to uncertain reason why the pain. Tennie Shawl did not seem to help patient very much. She took about a month of it and none for the past couple of weeks. Hx of a therapist however she moved away.  )        HPI:     HPI this 69-year-old female presents today for follow-up. She states that she stopped the about the because she thought that it was causing her headache and she did not feel like it was doing a significant amount of good. She reports she is having ongoing issues with chest pain on her back. She states that she is really worried that something is going on. Patient reports that she is using meth still. She states that she does not use a large amount but just the amount that makes her feel like she felt when the doctors had her on a stimulant medication for her excessive daytime sleepiness. Patient reports that she had trouble ever staying awake until she was diagnosed and she was put on a stimulant every day to help her with that. States that her insurance changed and she was no longer able to afford that medicine so instead she purchases meth. She is nearly in tears today because of concern over her health. She does state that she would be open to seeing a therapist.  She denies any suicidal thoughts today but she is significantly depressed. Chief Complaint   Patient presents with    Follow-up     Chest pain which started October/November area. Pain is higher area of her back/lungs.       Depression     Denies suicidal

## 2023-12-26 NOTE — TELEPHONE ENCOUNTER
Pt's PCP office called to schedule an appt from a referral for Specialty Services     Appt scheduled with Sharon BLANCHARD for 12/29 @ 11 AM    Electronically signed by Rupinder Jimneez on 12/26/2023 at 11:59 AM.

## 2023-12-27 ENCOUNTER — TELEPHONE (OUTPATIENT)
Dept: PSYCHIATRY | Age: 58
End: 2023-12-27

## 2023-12-27 NOTE — TELEPHONE ENCOUNTER
Appointment For: Nasrin Lyle ([id number])   Visit Type: NEW PATIENT (1003)      1/4/2024     2:00 PM  60 mins. SANTO Baires  [facility]      Patient Comments:   I have to request a new insurance card.   ----------------------------------   This appointment rescheduled from:   12/29/2023  11:00 AM  60 mins. SANTO Baires CNP [facility]     Pt cancel/rescheduled appt through [employer].     Electronically signed by Melburn Koyanagi, MA on 12/27/2023 at 9:31 AM

## 2024-01-03 ENCOUNTER — TELEPHONE (OUTPATIENT)
Dept: PSYCHIATRY | Age: 59
End: 2024-01-03

## 2024-01-03 NOTE — TELEPHONE ENCOUNTER
Called patient to remind them of their appointment   -left voicemail, requesting a return call    Reminded patient to complete their visit pre-check/digital registration in [employer].    Electronically signed by Bonnie Hernandez MA on 1/3/2024 at 12:19 PM

## 2024-01-04 ENCOUNTER — HOSPITAL ENCOUNTER (OUTPATIENT)
Dept: CT IMAGING | Age: 59
Discharge: HOME | End: 2024-01-04
Payer: MEDICAID

## 2024-01-04 ENCOUNTER — OFFICE VISIT (OUTPATIENT)
Dept: PSYCHIATRY | Age: 59
End: 2024-01-04
Payer: MEDICAID

## 2024-01-04 DIAGNOSIS — F15.10 METHAMPHETAMINE ABUSE (HCC): ICD-10-CM

## 2024-01-04 DIAGNOSIS — R00.0 TACHYCARDIA: ICD-10-CM

## 2024-01-04 DIAGNOSIS — F39 MOOD DISORDER (HCC): Primary | ICD-10-CM

## 2024-01-04 DIAGNOSIS — M54.6 ACUTE MIDLINE THORACIC BACK PAIN: ICD-10-CM

## 2024-01-04 DIAGNOSIS — G47.10 HYPERSOMNIA: ICD-10-CM

## 2024-01-04 DIAGNOSIS — F19.90 SUBSTANCE USE: ICD-10-CM

## 2024-01-04 PROCEDURE — 90792 PSYCH DIAG EVAL W/MED SRVCS: CPT | Performed by: NURSE PRACTITIONER

## 2024-01-04 PROCEDURE — 71250 CT THORAX DX C-: CPT

## 2024-01-04 RX ORDER — HYDROXYZINE HYDROCHLORIDE 25 MG/1
25 TABLET, FILM COATED ORAL 3 TIMES DAILY PRN
Qty: 90 TABLET | Refills: 2 | Status: SHIPPED | OUTPATIENT
Start: 2024-01-04

## 2024-01-04 RX ORDER — BUPROPION HYDROCHLORIDE 150 MG/1
150 TABLET ORAL EVERY MORNING
Qty: 30 TABLET | Refills: 3 | Status: SHIPPED | OUTPATIENT
Start: 2024-01-04

## 2024-01-04 RX ORDER — QUETIAPINE FUMARATE 25 MG/1
25 TABLET, FILM COATED ORAL NIGHTLY
Qty: 30 TABLET | Refills: 2 | Status: SHIPPED | OUTPATIENT
Start: 2024-01-04

## 2024-01-04 ASSESSMENT — COLUMBIA-SUICIDE SEVERITY RATING SCALE - C-SSRS
5. HAVE YOU STARTED TO WORK OUT OR WORKED OUT THE DETAILS OF HOW TO KILL YOURSELF? DO YOU INTEND TO CARRY OUT THIS PLAN?: NO
3. HAVE YOU BEEN THINKING ABOUT HOW YOU MIGHT KILL YOURSELF?: NO
2. HAVE YOU ACTUALLY HAD ANY THOUGHTS OF KILLING YOURSELF?: NO
6. HAVE YOU EVER DONE ANYTHING, STARTED TO DO ANYTHING, OR PREPARED TO DO ANYTHING TO END YOUR LIFE?: NO
1. WITHIN THE PAST MONTH, HAVE YOU WISHED YOU WERE DEAD OR WISHED YOU COULD GO TO SLEEP AND NOT WAKE UP?: NO
7. DID THIS OCCUR IN THE LAST THREE MONTHS: NO
4. HAVE YOU HAD THESE THOUGHTS AND HAD SOME INTENTION OF ACTING ON THEM?: NO

## 2024-01-04 ASSESSMENT — PATIENT HEALTH QUESTIONNAIRE - PHQ9
2. FEELING DOWN, DEPRESSED OR HOPELESS: 3
6. FEELING BAD ABOUT YOURSELF - OR THAT YOU ARE A FAILURE OR HAVE LET YOURSELF OR YOUR FAMILY DOWN: 3
3. TROUBLE FALLING OR STAYING ASLEEP: 3
10. IF YOU CHECKED OFF ANY PROBLEMS, HOW DIFFICULT HAVE THESE PROBLEMS MADE IT FOR YOU TO DO YOUR WORK, TAKE CARE OF THINGS AT HOME, OR GET ALONG WITH OTHER PEOPLE: 1
8. MOVING OR SPEAKING SO SLOWLY THAT OTHER PEOPLE COULD HAVE NOTICED. OR THE OPPOSITE, BEING SO FIGETY OR RESTLESS THAT YOU HAVE BEEN MOVING AROUND A LOT MORE THAN USUAL: 2
SUM OF ALL RESPONSES TO PHQ9 QUESTIONS 1 & 2: 6
1. LITTLE INTEREST OR PLEASURE IN DOING THINGS: 3
9. THOUGHTS THAT YOU WOULD BE BETTER OFF DEAD, OR OF HURTING YOURSELF: 1
4. FEELING TIRED OR HAVING LITTLE ENERGY: 3
SUM OF ALL RESPONSES TO PHQ QUESTIONS 1-9: 24
SUM OF ALL RESPONSES TO PHQ QUESTIONS 1-9: 24
5. POOR APPETITE OR OVEREATING: 3
SUM OF ALL RESPONSES TO PHQ QUESTIONS 1-9: 23
SUM OF ALL RESPONSES TO PHQ QUESTIONS 1-9: 24
7. TROUBLE CONCENTRATING ON THINGS, SUCH AS READING THE NEWSPAPER OR WATCHING TELEVISION: 3

## 2024-01-04 NOTE — PROGRESS NOTES
PSYCHIATRIC EVALUATION    Date of Service:  1/4/24     Chief Complaint   Patient presents with    New Patient       HISTORY OF PRESENT ILLNESS  The patient is a 58 y.o.   female who is here for psychiatric evaluation due to continual complaints of depression and anxiety.  She states she has had issues with depression the majoirty of her life.  She has tried several treatments but nothing has lasted.    Today patient states, \" she is overwhelmed today.  She went to the wrong building and was late to her appointment.  She can not identify any sources of her depression.  She states that she feels like she made a mess of everything and its too late to make adjustments.  She states she uses meth and marijuana several times a day.  She states she uses small doses of meth so that she gets out of bed because she lays in bed all day if she does not use meth.  She states she has excessive daytime fatigue.  We discussed obtaining a sleep study at this time for sleep apnea or narcolepsy.    We discussed beginning wellbutrin xl 150 mg daily for depression, focus and concentration, additionally we discussed beginning seroquel 25 mg nightly for sleep promotion.  We discussed long term use of illicit substances and long term impact on mental health.  She was referred for therapy as well.  Will follow up in 1 month.    Sleep:  does not wake up feeling rested.  She wakes up frequently through the night.  She thinks she snores but can not confirm because she sleeps alone.    Pt endorses excessive spending, mood swings, irritability, manic or hypomanic episodes.    Pt denies SI, HI, Auditory, visual, and tactile hallucinations at this time.    Appetite: eating too much    Caffeine use: coffee soda through the day    Support:  friends,    PSYCHIATRIC HISTORY  Previous diagnoses:  MDD, ADD  Suicide attempts/gestures: Denies   Prior hospitalizations: 1 x in [place]   Prior Therapy: none    PREVIOUS MED TRIALS  Zoloft  Auvelity -

## 2024-01-05 DIAGNOSIS — R91.8 MULTIPLE LUNG NODULES: Primary | ICD-10-CM

## 2024-01-08 ENCOUNTER — TELEPHONE (OUTPATIENT)
Dept: NEUROLOGY | Age: 59
End: 2024-01-08

## 2024-01-08 NOTE — TELEPHONE ENCOUNTER
Received a referral from Dr. James Daniels office for this patient. Called and left patient a VM to call our office back to where we can get her scheduled an appointment with JULIA Marquis.

## 2024-01-18 ENCOUNTER — TELEPHONE (OUTPATIENT)
Dept: NEUROLOGY | Age: 59
End: 2024-01-18

## 2024-01-19 ENCOUNTER — TELEPHONE (OUTPATIENT)
Dept: PSYCHIATRY | Age: 59
End: 2024-01-19

## 2024-01-19 NOTE — TELEPHONE ENCOUNTER
Sent Referral to [facility] for therapy.    MA called [facility] to get an update on the referral our office sent out to them for therapy.    [facility] stated that they do not take her insurance.    Notified the provider     Sent Referral to [facility] for therapy.    Electronically signed by Vielka Crews MA on 1/19/2024 at 2:16 PM

## 2024-01-29 ENCOUNTER — TELEPHONE (OUTPATIENT)
Dept: PSYCHIATRY | Age: 59
End: 2024-01-29

## 2024-01-29 NOTE — TELEPHONE ENCOUNTER
Appointment canceled for Phuong Huitronkley ([id number])   Visit Type: FOLLOW UP   Date        Time      Length    Provider                  Department   2/1/2024     1:30 PM  30 mins.  SANTO Nobles - CNP [facility]      Reason for Cancellation: Other     Pt cancelled appt through [employer].    Electronically signed by Vielka Crews MA on 1/29/2024 at 1:16 PM

## 2024-02-01 ENCOUNTER — TELEPHONE (OUTPATIENT)
Dept: NEUROLOGY | Age: 59
End: 2024-02-01

## 2024-02-06 ENCOUNTER — TELEPHONE (OUTPATIENT)
Dept: PSYCHIATRY | Age: 59
End: 2024-02-06

## 2024-02-06 NOTE — TELEPHONE ENCOUNTER
ROSCOE Daniels APRN informed that Neurology was unable to reach the pt to schedule the consult after 3 attempts made.  No further action per the APRN

## 2024-03-01 ENCOUNTER — TELEPHONE (OUTPATIENT)
Dept: PSYCHIATRY | Age: 59
End: 2024-03-01

## 2024-03-01 NOTE — TELEPHONE ENCOUNTER
MA called [facility] to get an update on the referral our office sent out to them for therapy.    [facility] has reached out twice to try to schedule an appt and there hasn't been any response back.    Notified the provider and it is up to the pt to call and schedule an appt.    Electronically signed by Vielka Crews MA on 3/1/2024 at 1:43 PM

## 2024-12-04 ENCOUNTER — TRANSCRIBE ORDERS (OUTPATIENT)
Dept: ADMINISTRATIVE | Age: 59
End: 2024-12-04

## 2024-12-04 DIAGNOSIS — Z12.31 ENCOUNTER FOR SCREENING MAMMOGRAM FOR MALIGNANT NEOPLASM OF BREAST: Primary | ICD-10-CM

## 2025-01-27 ENCOUNTER — TRANSCRIBE ORDERS (OUTPATIENT)
Dept: ADMINISTRATIVE | Facility: HOSPITAL | Age: 60
End: 2025-01-27
Payer: COMMERCIAL

## 2025-01-27 DIAGNOSIS — Z12.31 ENCOUNTER FOR SCREENING MAMMOGRAM FOR MALIGNANT NEOPLASM OF BREAST: Primary | ICD-10-CM

## 2025-02-03 LAB
NCCN CRITERIA FLAG: NORMAL
TYRER CUZICK SCORE: 7.9

## 2025-02-06 ENCOUNTER — TELEPHONE (OUTPATIENT)
Dept: GASTROENTEROLOGY | Facility: CLINIC | Age: 60
End: 2025-02-06
Payer: COMMERCIAL

## 2025-02-06 NOTE — TELEPHONE ENCOUNTER
Letter sent via my chart and printed for mail to discuss fast track option for colonoscopy screening    Please verify if any family history of colon polyps or colon cancer present as well as if Brittni Mcarthur has undergone previous colonoscopy     History of colon polyps with last colonoscopy 2021 at [facility]    Positive family history of colon cancer in father      Please call her and if she does not wish to fast trac I can do a telephone visit on Friday Feb 14.

## 2025-02-19 DIAGNOSIS — Z86.0100 HISTORY OF COLON POLYPS: Primary | ICD-10-CM

## 2025-02-19 DIAGNOSIS — Z80.0 FAMILY HISTORY OF COLON CANCER: ICD-10-CM

## 2025-04-02 ENCOUNTER — TELEPHONE (OUTPATIENT)
Dept: GASTROENTEROLOGY | Facility: HOSPITAL | Age: 60
End: 2025-04-02

## 2025-04-04 ENCOUNTER — ANESTHESIA EVENT (OUTPATIENT)
Dept: GASTROENTEROLOGY | Facility: HOSPITAL | Age: 60
End: 2025-04-04
Payer: COMMERCIAL

## 2025-04-04 ENCOUNTER — ANESTHESIA (OUTPATIENT)
Dept: GASTROENTEROLOGY | Facility: HOSPITAL | Age: 60
End: 2025-04-04
Payer: COMMERCIAL

## 2025-04-04 ENCOUNTER — HOSPITAL ENCOUNTER (OUTPATIENT)
Facility: HOSPITAL | Age: 60
Setting detail: HOSPITAL OUTPATIENT SURGERY
Discharge: HOME | End: 2025-04-04
Attending: INTERNAL MEDICINE | Admitting: INTERNAL MEDICINE
Payer: COMMERCIAL

## 2025-04-04 VITALS
DIASTOLIC BLOOD PRESSURE: 69 MMHG | HEART RATE: 106 BPM | HEIGHT: 62 IN | RESPIRATION RATE: 21 BRPM | BODY MASS INDEX: 32.94 KG/M2 | SYSTOLIC BLOOD PRESSURE: 134 MMHG | OXYGEN SATURATION: 98 % | WEIGHT: 179 LBS | TEMPERATURE: 98.6 F

## 2025-04-04 DIAGNOSIS — Z86.0100 HISTORY OF COLON POLYPS: ICD-10-CM

## 2025-04-04 PROCEDURE — 25810000003 SODIUM CHLORIDE 0.9 % SOLUTION

## 2025-04-04 PROCEDURE — 25010000002 LIDOCAINE PF 2% 2 % SOLUTION

## 2025-04-04 PROCEDURE — 25810000003 SODIUM CHLORIDE 0.9 % SOLUTION: Performed by: ANESTHESIOLOGY

## 2025-04-04 PROCEDURE — 25010000002 PROPOFOL 10 MG/ML EMULSION

## 2025-04-04 PROCEDURE — 45385 COLONOSCOPY W/LESION REMOVAL: CPT | Performed by: INTERNAL MEDICINE

## 2025-04-04 RX ORDER — PROPOFOL 10 MG/ML
VIAL (ML) INTRAVENOUS AS NEEDED
Status: DISCONTINUED | OUTPATIENT
Start: 2025-04-04 | End: 2025-04-04 | Stop reason: SURG

## 2025-04-04 RX ORDER — LIDOCAINE HYDROCHLORIDE 20 MG/ML
INJECTION, SOLUTION EPIDURAL; INFILTRATION; INTRACAUDAL; PERINEURAL AS NEEDED
Status: DISCONTINUED | OUTPATIENT
Start: 2025-04-04 | End: 2025-04-04 | Stop reason: SURG

## 2025-04-04 RX ORDER — SODIUM CHLORIDE 9 MG/ML
INJECTION, SOLUTION INTRAVENOUS CONTINUOUS PRN
Status: DISCONTINUED | OUTPATIENT
Start: 2025-04-04 | End: 2025-04-04 | Stop reason: SURG

## 2025-04-04 RX ORDER — SODIUM CHLORIDE 9 MG/ML
500 INJECTION, SOLUTION INTRAVENOUS ONCE
Status: COMPLETED | OUTPATIENT
Start: 2025-04-04 | End: 2025-04-04

## 2025-04-04 RX ORDER — SODIUM CHLORIDE 0.9 % (FLUSH) 0.9 %
10 SYRINGE (ML) INJECTION AS NEEDED
Status: DISCONTINUED | OUTPATIENT
Start: 2025-04-04 | End: 2025-04-04 | Stop reason: HOSPADM

## 2025-04-04 RX ADMIN — LIDOCAINE HYDROCHLORIDE 50 MG: 20 INJECTION, SOLUTION EPIDURAL; INFILTRATION; INTRACAUDAL; PERINEURAL at 10:49

## 2025-04-04 RX ADMIN — PROPOFOL 200 MG: 10 INJECTION, EMULSION INTRAVENOUS at 10:49

## 2025-04-04 RX ADMIN — SODIUM CHLORIDE: 9 INJECTION, SOLUTION INTRAVENOUS at 10:47

## 2025-04-04 RX ADMIN — SODIUM CHLORIDE 500 ML: 9 INJECTION, SOLUTION INTRAVENOUS at 08:19

## 2025-04-04 NOTE — ANESTHESIA PREPROCEDURE EVALUATION
Anesthesia Evaluation     Patient summary reviewed and Nursing notes reviewed   no history of anesthetic complications:   NPO Solid Status: > 8 hours  NPO Liquid Status: > 2 hours           Airway   Mallampati: I  TM distance: >3 FB  No difficulty expected  Dental    (+) upper dentures    Pulmonary    (+) a smoker Current,  Cardiovascular - negative cardio ROS  Exercise tolerance: good (4-7 METS)    (-) hypertension, CAD      Neuro/Psych  (-) seizures, TIA, CVA  GI/Hepatic/Renal/Endo    (+) diabetes mellitus (borderline)  (-) liver disease, no renal disease    Musculoskeletal (-) negative ROS    Abdominal    Substance History - negative use     OB/GYN negative ob/gyn ROS         Other                    Anesthesia Plan    ASA 2     MAC     intravenous induction     Anesthetic plan, risks, benefits, and alternatives have been provided, discussed and informed consent has been obtained with: patient.    CODE STATUS:

## 2025-04-04 NOTE — ANESTHESIA POSTPROCEDURE EVALUATION
"Patient: Brittni Mcarthur    Procedure Summary       Date: 04/04/25 Room / Location: [facility] ENDOSCOPY 2 / [facility] ENDOSCOPY    Anesthesia Start: 1047 Anesthesia Stop: 1100    Procedure: COLONOSCOPY WITH ANESTHESIA Diagnosis:     Surgeons: Suresh Maher DO Provider: Jez Albright CRNA    Anesthesia Type: MAC ASA Status: 2            Anesthesia Type: MAC    Vitals  No vitals data found for the desired time range.          Post Anesthesia Care and Evaluation    Patient location during evaluation: PHASE II  Patient participation: complete - patient participated  Level of consciousness: awake  Pain management: adequate    Airway patency: patent  Anesthetic complications: No anesthetic complications    Cardiovascular status: acceptable  Respiratory status: acceptable  Hydration status: acceptable    Comments: /80 (Patient Position: Sitting)   Pulse 51   Temp 98.6 °F (37 °C) (Temporal)   Resp 17   Ht 157.5 cm (62\")   Wt 81.2 kg (179 lb)   SpO2 97%   BMI 32.74 kg/m²       "

## 2025-04-04 NOTE — H&P
"[facility]  Pre Procedure History & Physical    Chief Complaint:   Fhx colon ca    Subjective     HPI:   Fhx colon ca    Past Medical History:   Past Medical History:   Diagnosis Date    History of colon polyps        Past Surgical History:  Past Surgical History:   Procedure Laterality Date    CERVICAL DISC SURGERY       SECTION         Family History:  Family History   Problem Relation Age of Onset    Colon cancer Father     Colon cancer Paternal Uncle     Esophageal cancer Neg Hx        Social History:   reports that she has been smoking cigarettes. She has never used smokeless tobacco. She reports current alcohol use. She reports current drug use. Drug: Marijuana.    Medications:   Prior to Admission medications    Medication Sig Start Date End Date Taking? Authorizing Provider   desvenlafaxine (PRISTIQ) 50 MG 24 hr tablet  25  Yes Provider, MD Lenny   Aspirin-Acetaminophen-Caffeine (EXCEDRIN PO) Take  by mouth.    Provider, MD Lenny       Allergies:  Sulfa antibiotics    ROS:    General: Weight stable  Resp: No SOA  Cardiovascular: No CP    Objective     Blood pressure 151/80, pulse 51, temperature 98.6 °F (37 °C), temperature source Temporal, resp. rate 17, height 157.5 cm (62\"), weight 81.2 kg (179 lb), SpO2 97%.    Physical Exam   Constitutional: Pt is oriented to person, place, and in no distress.   HENT: Mouth/Throat: Oropharynx is clear.   Cardiovascular: Normal rate, regular rhythm.    Pulmonary/Chest: Effort normal. No respiratory distress. No  wheezes.   Abdominal: Soft. Non-distended.  Skin: Skin is warm and dry.   Psychiatric: Mood, memory, affect and judgment appear normal.     Assessment & Plan     Diagnosis:  Fhx colon ca    Anticipated Surgical Procedure:  C-scope    The risks, benefits, and alternatives of this procedure have been discussed with the patient or the responsible party- the patient understands and agrees to proceed.        "

## 2025-04-07 ENCOUNTER — TELEPHONE (OUTPATIENT)
Dept: GASTROENTEROLOGY | Facility: CLINIC | Age: 60
End: 2025-04-07
Payer: COMMERCIAL

## (undated) DEVICE — SENSR O2 OXIMAX FNGR A/ 18IN NONSTR

## (undated) DEVICE — ARGYLE YANKAUER BULB TIP WITH VENT: Brand: ARGYLE

## (undated) DEVICE — DEFENDO AIR WATER SUCTION AND BIOPSY VALVE KIT FOR  OLYMPUS: Brand: DEFENDO AIR/WATER/SUCTION AND BIOPSY VALVE

## (undated) DEVICE — THE CHANNEL CLEANING BRUSH IS A NYLON FLEXI BRUSH ATTACHED TO A FLEXIBLE PLASTIC SHEATH DESIGNED TO SAFELY REMOVE DEBRIS FROM FLEXIBLE ENDOSCOPES.

## (undated) DEVICE — THE SINGLE USE ETRAP – POLYP TRAP IS USED FOR SUCTION RETRIEVAL OF ENDOSCOPICALLY REMOVED POLYPS.: Brand: ETRAP

## (undated) DEVICE — MASK,OXYGEN,MED CONC,ADLT,7' TUB, UC: Brand: PENDING

## (undated) DEVICE — SNAR POLYP CAPTIVATOR MICROHEX 13 240CM